# Patient Record
Sex: MALE | Race: WHITE | NOT HISPANIC OR LATINO | Employment: FULL TIME | URBAN - METROPOLITAN AREA
[De-identification: names, ages, dates, MRNs, and addresses within clinical notes are randomized per-mention and may not be internally consistent; named-entity substitution may affect disease eponyms.]

---

## 2019-01-31 ENCOUNTER — OFFICE VISIT CONVERTED (OUTPATIENT)
Dept: FAMILY MEDICINE CLINIC | Facility: CLINIC | Age: 24
End: 2019-01-31
Attending: FAMILY MEDICINE

## 2020-01-31 ENCOUNTER — OFFICE VISIT CONVERTED (OUTPATIENT)
Dept: FAMILY MEDICINE CLINIC | Facility: CLINIC | Age: 25
End: 2020-01-31
Attending: FAMILY MEDICINE

## 2021-05-15 VITALS
HEIGHT: 69 IN | OXYGEN SATURATION: 100 % | BODY MASS INDEX: 21.66 KG/M2 | WEIGHT: 146.25 LBS | SYSTOLIC BLOOD PRESSURE: 118 MMHG | DIASTOLIC BLOOD PRESSURE: 73 MMHG | HEART RATE: 84 BPM | TEMPERATURE: 97.9 F

## 2021-05-15 VITALS
OXYGEN SATURATION: 100 % | SYSTOLIC BLOOD PRESSURE: 119 MMHG | WEIGHT: 153.12 LBS | BODY MASS INDEX: 22.68 KG/M2 | HEART RATE: 61 BPM | HEIGHT: 69 IN | DIASTOLIC BLOOD PRESSURE: 84 MMHG

## 2022-07-20 ENCOUNTER — APPOINTMENT (OUTPATIENT)
Dept: ULTRASOUND IMAGING | Facility: HOSPITAL | Age: 27
End: 2022-07-20

## 2022-07-20 ENCOUNTER — HOSPITAL ENCOUNTER (EMERGENCY)
Facility: HOSPITAL | Age: 27
Discharge: HOME | End: 2022-07-20
Attending: EMERGENCY MEDICINE | Admitting: EMERGENCY MEDICINE

## 2022-07-20 VITALS
TEMPERATURE: 98.2 F | OXYGEN SATURATION: 100 % | HEART RATE: 70 BPM | WEIGHT: 147.27 LBS | SYSTOLIC BLOOD PRESSURE: 128 MMHG | RESPIRATION RATE: 16 BRPM | HEIGHT: 68 IN | BODY MASS INDEX: 22.32 KG/M2 | DIASTOLIC BLOOD PRESSURE: 78 MMHG

## 2022-07-20 DIAGNOSIS — N50.82 SCROTAL PAIN: Primary | ICD-10-CM

## 2022-07-20 PROCEDURE — 93976 VASCULAR STUDY: CPT

## 2022-07-20 PROCEDURE — 99282 EMERGENCY DEPT VISIT SF MDM: CPT

## 2022-07-20 RX ORDER — CEPHALEXIN 500 MG/1
500 CAPSULE ORAL 2 TIMES DAILY
Qty: 14 CAPSULE | Refills: 0 | Status: SHIPPED | OUTPATIENT
Start: 2022-07-20 | End: 2022-07-27

## 2022-07-20 NOTE — DISCHARGE INSTRUCTIONS
Follow-up your family doctor in 2 days as already scheduled.  Return to the emergency department for fever, burning/discomfort with urination or abdominal pain.

## 2022-07-20 NOTE — ED PROVIDER NOTES
Time: 11:33 AM EDT  Arrived by: private car  Chief Complaint: testicular pain  History provided by: pt  History is limited by: N/A     History of Present Illness:  Patient is a 26 y.o. year old male who presents to the emergency department with  A chief complaint of right sided testicle pain. Pt states he has been in pain for three days, and it has progressively worsened, and pt states the pain as achy, 6/10. Pt states he had abdomen pain about three days ago. Pt states he is a virgin, and has never had intercourse before. Pt denies trauma.          Similar Symptoms Previously: No  Recently seen: No      Patient Care Team  Primary Care Provider: Micha Sotelo DO    Past Medical History:     No Known Allergies  History reviewed. No pertinent past medical history.  History reviewed. No pertinent surgical history.  History reviewed. No pertinent family history.    Home Medications:  Prior to Admission medications    Not on File        Social History:   Social History     Tobacco Use   • Smoking status: Never Smoker   • Smokeless tobacco: Never Used   Vaping Use   • Vaping Use: Never used   Substance Use Topics   • Alcohol use: Yes     Comment: occ, socially   • Drug use: Never     Recent travel: no     Review of Systems:  Review of Systems   Constitutional: Negative for chills and fever.   HENT: Negative for congestion, rhinorrhea and sore throat.    Eyes: Negative for pain and visual disturbance.   Respiratory: Negative for apnea, cough, chest tightness and shortness of breath.    Cardiovascular: Negative for chest pain and palpitations.   Gastrointestinal: Positive for abdominal pain. Negative for diarrhea, nausea and vomiting.   Genitourinary: Positive for testicular pain (Right sided). Negative for difficulty urinating and dysuria.   Musculoskeletal: Negative for joint swelling and myalgias.   Skin: Negative for color change.   Neurological: Negative for seizures and headaches.   Psychiatric/Behavioral:  "Negative.    All other systems reviewed and are negative.       Physical Exam:  /78 (BP Location: Left arm, Patient Position: Sitting)   Pulse 70   Temp 98.2 °F (36.8 °C) (Oral)   Resp 16   Ht 172.7 cm (68\")   Wt 66.8 kg (147 lb 4.3 oz)   SpO2 100%   BMI 22.39 kg/m²     Physical Exam  Vitals and nursing note reviewed.   Constitutional:       Appearance: Normal appearance.   HENT:      Head: Normocephalic and atraumatic.      Nose: Nose normal.   Eyes:      Extraocular Movements: Extraocular movements intact.      Pupils: Pupils are equal, round, and reactive to light.   Cardiovascular:      Rate and Rhythm: Normal rate and regular rhythm.      Heart sounds: Normal heart sounds.   Pulmonary:      Effort: Pulmonary effort is normal.      Breath sounds: Normal breath sounds.   Abdominal:      General: Bowel sounds are normal.      Palpations: Abdomen is soft.      Tenderness: There is no abdominal tenderness.   Musculoskeletal:         General: No swelling. Normal range of motion.      Cervical back: Normal range of motion and neck supple.   Skin:     General: Skin is warm and dry.      Coloration: Skin is not jaundiced.   Neurological:      General: No focal deficit present.      Mental Status: He is alert and oriented to person, place, and time. Mental status is at baseline.   Psychiatric:         Mood and Affect: Mood normal.         Behavior: Behavior normal.         Judgment: Judgment normal.                Medications in the Emergency Department:  Medications - No data to display     Labs  Lab Results (last 24 hours)     ** No results found for the last 24 hours. **           Imaging:  No Radiology Exams Resulted Within Past 24 Hours    Procedures:  Procedures    Progress                            Medical Decision Making:  MDM  Number of Diagnoses or Management Options  Scrotal pain: new and requires workup     Amount and/or Complexity of Data Reviewed  Clinical lab tests: reviewed  Tests in the " radiology section of CPT®: reviewed  Tests in the medicine section of CPT®: reviewed  Review and summarize past medical records: yes  Independent visualization of images, tracings, or specimens: yes    Risk of Complications, Morbidity, and/or Mortality  Presenting problems: moderate  Management options: low    Patient Progress  Patient progress: stable       Final diagnoses:   Scrotal pain        Disposition:  ED Disposition     ED Disposition   Discharge    Condition   Stable    Comment   --             This medical record created using voice recognition software.           Cj Olmstead  07/20/22 [id number]       Jalen Gale MD  07/23/22 9648

## 2022-07-22 ENCOUNTER — OFFICE VISIT (OUTPATIENT)
Dept: FAMILY MEDICINE CLINIC | Facility: CLINIC | Age: 27
End: 2022-07-22

## 2022-07-22 VITALS
SYSTOLIC BLOOD PRESSURE: 125 MMHG | TEMPERATURE: 97.6 F | WEIGHT: 146 LBS | BODY MASS INDEX: 22.13 KG/M2 | OXYGEN SATURATION: 99 % | HEIGHT: 68 IN | DIASTOLIC BLOOD PRESSURE: 85 MMHG | HEART RATE: 61 BPM

## 2022-07-22 DIAGNOSIS — K40.90 NON-RECURRENT UNILATERAL INGUINAL HERNIA WITHOUT OBSTRUCTION OR GANGRENE: ICD-10-CM

## 2022-07-22 DIAGNOSIS — N50.811 PAIN IN RIGHT TESTICLE: Primary | ICD-10-CM

## 2022-07-22 PROCEDURE — 99213 OFFICE O/P EST LOW 20 MIN: CPT | Performed by: FAMILY MEDICINE

## 2022-07-22 NOTE — PROGRESS NOTES
Chief Complaint   Patient presents with   • Testicle Pain     Pt states been going on since Sunday.         Subjective     Serge Peterson  has no past medical history on file.    Testicle pain- complains of right testicle pain now for about the last 5 days.  He denies any preceding injury or trauma.  He denies any abnormalities about the testicle or swelling.  He was seen at the emergency room recently.  His ultrasound showed a small left-sided inguinal hernia, but nothing on the right side.      PHQ-2 Depression Screening  Little interest or pleasure in doing things?     Feeling down, depressed, or hopeless?     PHQ-2 Total Score     PHQ-9 Depression Screening  Little interest or pleasure in doing things?     Feeling down, depressed, or hopeless?     Trouble falling or staying asleep, or sleeping too much?     Feeling tired or having little energy?     Poor appetite or overeating?     Feeling bad about yourself - or that you are a failure or have let yourself or your family down?     Trouble concentrating on things, such as reading the newspaper or watching television?     Moving or speaking so slowly that other people could have noticed? Or the opposite - being so fidgety or restless that you have been moving around a lot more than usual?     Thoughts that you would be better off dead, or of hurting yourself in some way?     PHQ-9 Total Score     If you checked off any problems, how difficult have these problems made it for you to do your work, take care of things at home, or get along with other people?       No Known Allergies    Prior to Admission medications    Medication Sig Start Date End Date Taking? Authorizing Provider   cephalexin (KEFLEX) 500 MG capsule Take 1 capsule by mouth 2 (Two) Times a Day for 7 days. 7/20/22 7/27/22  Jalen Gale MD        Patient Active Problem List   Diagnosis   • Pain in right testicle   • Non-recurrent unilateral inguinal hernia without obstruction or gangrene     "    History reviewed. No pertinent surgical history.    Social History     Socioeconomic History   • Marital status: Single   Tobacco Use   • Smoking status: Never Smoker   • Smokeless tobacco: Never Used   Vaping Use   • Vaping Use: Never used   Substance and Sexual Activity   • Alcohol use: Yes     Comment: occ, socially   • Drug use: Never   • Sexual activity: Defer       History reviewed. No pertinent family history.    Family history, surgical history, past medical history, Allergies and meds reviewed with patient today and updated in [facility] EMR.     ROS:  Review of Systems   Constitutional: Negative for fatigue.   Genitourinary: Positive for testicular pain. Negative for dysuria, frequency and hematuria.       OBJECTIVE:  Vitals:    07/22/22 1028   BP: 125/85   Pulse: 61   Temp: 97.6 °F (36.4 °C)   SpO2: 99%   Weight: 66.2 kg (146 lb)   Height: 172.7 cm (68\")     No exam data present   Body mass index is 22.2 kg/m².  No LMP for male patient.    Physical Exam  Vitals and nursing note reviewed.   Constitutional:       General: He is not in acute distress.     Appearance: He is normal weight.   HENT:      Head: Normocephalic.   Genitourinary:     Penis: Normal and circumcised.       Testes: Normal.         Right: Tenderness not present.         Left: Tenderness not present.      Casey stage (genital): 5.   Neurological:      Mental Status: He is alert.         Procedures    No visits with results within 30 Day(s) from this visit.   Latest known visit with results is:   No results found for any previous visit.       ASSESSMENT/ PLAN:    Diagnoses and all orders for this visit:    1. Pain in right testicle (Primary)  Assessment & Plan:  His exam is normal today he had his ultrasound did not show any pathology on the right side.  He does have a small left inguinal hernia that he can get repaired at any time.  This is most likely an epididymitis.  We will have him get and take the course of antibiotics from the " emergency room.  If his symptoms persist he needs to be reevaluated.      2. Non-recurrent unilateral inguinal hernia without obstruction or gangrene  Assessment & Plan:  He is asymptomatic at this time.  Instructed him that this needs to be prepared in a timely fashion.  If he notices that it starts to get larger it is time to be of evaluated.        Orders Placed Today:     No orders of the defined types were placed in this encounter.       Management Plan:     An After Visit Summary was printed and given to the patient at discharge.    Follow-up: Return if symptoms worsen or fail to improve, for Recheck.    Micha Sotelo DO 7/22/2022 11:10 EDT  This note was electronically signed.

## 2022-07-22 NOTE — ASSESSMENT & PLAN NOTE
He is asymptomatic at this time.  Instructed him that this needs to be prepared in a timely fashion.  If he notices that it starts to get larger it is time to be of evaluated.

## 2022-07-22 NOTE — ASSESSMENT & PLAN NOTE
His exam is normal today he had his ultrasound did not show any pathology on the right side.  He does have a small left inguinal hernia that he can get repaired at any time.  This is most likely an epididymitis.  We will have him get and take the course of antibiotics from the emergency room.  If his symptoms persist he needs to be reevaluated.

## 2022-08-16 ENCOUNTER — TELEPHONE (OUTPATIENT)
Dept: FAMILY MEDICINE CLINIC | Facility: CLINIC | Age: 27
End: 2022-08-16

## 2022-08-16 ENCOUNTER — OFFICE VISIT (OUTPATIENT)
Dept: FAMILY MEDICINE CLINIC | Facility: CLINIC | Age: 27
End: 2022-08-16

## 2022-08-16 VITALS
SYSTOLIC BLOOD PRESSURE: 131 MMHG | DIASTOLIC BLOOD PRESSURE: 70 MMHG | HEART RATE: 63 BPM | OXYGEN SATURATION: 100 % | WEIGHT: 147.4 LBS | TEMPERATURE: 98.4 F | BODY MASS INDEX: 22.34 KG/M2 | HEIGHT: 68 IN

## 2022-08-16 DIAGNOSIS — H61.23 BILATERAL IMPACTED CERUMEN: Primary | ICD-10-CM

## 2022-08-16 PROCEDURE — 99213 OFFICE O/P EST LOW 20 MIN: CPT | Performed by: FAMILY MEDICINE

## 2022-08-16 NOTE — TELEPHONE ENCOUNTER
Pt is stating that he has ear pain and pressure , wants to know what he can do . Your at cap today . Please advise

## 2022-08-16 NOTE — ASSESSMENT & PLAN NOTE
We will irrigate both ears until clear and then reevaluate.  His left ear irrigated easily.  His right ear had a significant amount of impacted cerumen.  After several attempts at irrigation were still unable to get it cleared out adequately.  We will add on some Debrox drops and then he will irrigate with warm water at home.  If it still is persistent he will stop back for a nurse visit to get reirrigated again at the end of the week.

## 2022-08-16 NOTE — PROGRESS NOTES
Chief Complaint   Patient presents with   • Earache        Subjective     Serge Peterson  has no past medical history on file.    Earache- he has had a right earache for the past 24 hours.  He got an ear cleaning kit to clean it out and states it actually made his symptoms worse.  He denies any head congestion postnasal drip or runny nose.      PHQ-2 Depression Screening  Little interest or pleasure in doing things?     Feeling down, depressed, or hopeless?     PHQ-2 Total Score     PHQ-9 Depression Screening  Little interest or pleasure in doing things?     Feeling down, depressed, or hopeless?     Trouble falling or staying asleep, or sleeping too much?     Feeling tired or having little energy?     Poor appetite or overeating?     Feeling bad about yourself - or that you are a failure or have let yourself or your family down?     Trouble concentrating on things, such as reading the newspaper or watching television?     Moving or speaking so slowly that other people could have noticed? Or the opposite - being so fidgety or restless that you have been moving around a lot more than usual?     Thoughts that you would be better off dead, or of hurting yourself in some way?     PHQ-9 Total Score     If you checked off any problems, how difficult have these problems made it for you to do your work, take care of things at home, or get along with other people?       No Known Allergies    Prior to Admission medications    Not on File        Patient Active Problem List   Diagnosis   • Pain in right testicle   • Non-recurrent unilateral inguinal hernia without obstruction or gangrene   • Bilateral impacted cerumen        History reviewed. No pertinent surgical history.    Social History     Socioeconomic History   • Marital status: Single   Tobacco Use   • Smoking status: Never Smoker   • Smokeless tobacco: Never Used   Vaping Use   • Vaping Use: Never used   Substance and Sexual Activity   • Alcohol use: Yes     Comment:  "occ, socially   • Drug use: Never   • Sexual activity: Defer       History reviewed. No pertinent family history.    Family history, surgical history, past medical history, Allergies and meds reviewed with patient today and updated in [facility] EMR.     ROS:  Review of Systems   Constitutional: Negative for chills and fever.   HENT: Positive for ear pain (right). Negative for congestion, postnasal drip and rhinorrhea.        OBJECTIVE:  Vitals:    08/16/22 1515   BP: 131/70   BP Location: Right arm   Patient Position: Sitting   Pulse: 63   Temp: 98.4 °F (36.9 °C)   SpO2: 100%   Weight: 66.9 kg (147 lb 6.4 oz)   Height: 172.7 cm (68\")     No exam data present   Body mass index is 22.41 kg/m².  No LMP for male patient.    Physical Exam  Vitals and nursing note reviewed.   Constitutional:       General: He is not in acute distress.     Appearance: Normal appearance. He is normal weight.   HENT:      Head: Normocephalic.      Right Ear: Ear canal and external ear normal. There is impacted cerumen.      Left Ear: Ear canal and external ear normal. There is impacted cerumen.      Nose: Nose normal.      Mouth/Throat:      Mouth: Mucous membranes are moist.      Pharynx: Oropharynx is clear.   Eyes:      General: No scleral icterus.     Conjunctiva/sclera: Conjunctivae normal.      Pupils: Pupils are equal, round, and reactive to light.   Cardiovascular:      Rate and Rhythm: Normal rate and regular rhythm.      Pulses: Normal pulses.      Heart sounds: Normal heart sounds. No murmur heard.  Pulmonary:      Effort: Pulmonary effort is normal.      Breath sounds: Normal breath sounds. No wheezing, rhonchi or rales.   Musculoskeletal:      Cervical back: Neck supple. No rigidity or tenderness.   Lymphadenopathy:      Cervical: No cervical adenopathy.   Skin:     General: Skin is warm and dry.      Coloration: Skin is not jaundiced.      Findings: No rash.   Neurological:      General: No focal deficit present.      Mental Status: " He is alert and oriented to person, place, and time.   Psychiatric:         Mood and Affect: Mood normal.         Thought Content: Thought content normal.         Judgment: Judgment normal.         Procedures    No visits with results within 30 Day(s) from this visit.   Latest known visit with results is:   No results found for any previous visit.       ASSESSMENT/ PLAN:    Diagnoses and all orders for this visit:    1. Bilateral impacted cerumen (Primary)  Assessment & Plan:  We will irrigate both ears until clear and then reevaluate.  His left ear irrigated easily.  His right ear had a significant amount of impacted cerumen.  After several attempts at irrigation were still unable to get it cleared out adequately.  We will add on some Debrox drops and then he will irrigate with warm water at home.  If it still is persistent he will stop back for a nurse visit to get reirrigated again at the end of the week.    Orders:  -     Ear Cerumen Removal      Orders Placed Today:     No orders of the defined types were placed in this encounter.       Management Plan:     An After Visit Summary was printed and given to the patient at discharge.    Follow-up: Return if symptoms worsen or fail to improve.    Micha Sotelo DO 8/16/2022 17:02 EDT  This note was electronically signed.

## 2022-09-30 ENCOUNTER — OFFICE VISIT (OUTPATIENT)
Dept: FAMILY MEDICINE CLINIC | Facility: CLINIC | Age: 27
End: 2022-09-30

## 2022-09-30 VITALS
TEMPERATURE: 98 F | OXYGEN SATURATION: 99 % | DIASTOLIC BLOOD PRESSURE: 80 MMHG | HEART RATE: 74 BPM | BODY MASS INDEX: 22.43 KG/M2 | HEIGHT: 68 IN | WEIGHT: 148 LBS | SYSTOLIC BLOOD PRESSURE: 141 MMHG

## 2022-09-30 DIAGNOSIS — S39.012A LUMBAR STRAIN, INITIAL ENCOUNTER: Primary | ICD-10-CM

## 2022-09-30 PROCEDURE — 99213 OFFICE O/P EST LOW 20 MIN: CPT | Performed by: FAMILY MEDICINE

## 2022-09-30 RX ORDER — NAPROXEN 500 MG/1
500 TABLET ORAL 2 TIMES DAILY WITH MEALS
Qty: 60 TABLET | Refills: 0 | Status: SHIPPED | OUTPATIENT
Start: 2022-09-30 | End: 2022-12-12

## 2022-09-30 RX ORDER — CYCLOBENZAPRINE HCL 5 MG
5 TABLET ORAL 3 TIMES DAILY PRN
Qty: 20 TABLET | Refills: 0 | Status: SHIPPED | OUTPATIENT
Start: 2022-09-30 | End: 2022-12-12

## 2022-09-30 NOTE — PROGRESS NOTES
Chief Complaint   Patient presents with   • Back Pain     Left side         Subjective     Serge Peterson  has no past medical history on file.    Back Pain  This is a recurrent problem. The current episode started in the past 7 days. The problem occurs daily. The problem has been waxing and waning since onset. The pain is present in the lumbar spine. The quality of the pain is described as aching and stabbing. The pain does not radiate. The pain is at a severity of 7/10. The pain is worse during the day. The symptoms are aggravated by bending and twisting. Pertinent negatives include no abdominal pain, bladder incontinence, bowel incontinence, chest pain, dysuria, fever, headaches, leg pain, numbness, paresis, paresthesias, pelvic pain, perianal numbness, tingling, weakness or weight loss. The treatment provided no relief.       PHQ-2 Depression Screening  Little interest or pleasure in doing things?     Feeling down, depressed, or hopeless?     PHQ-2 Total Score     PHQ-9 Depression Screening  Little interest or pleasure in doing things?     Feeling down, depressed, or hopeless?     Trouble falling or staying asleep, or sleeping too much?     Feeling tired or having little energy?     Poor appetite or overeating?     Feeling bad about yourself - or that you are a failure or have let yourself or your family down?     Trouble concentrating on things, such as reading the newspaper or watching television?     Moving or speaking so slowly that other people could have noticed? Or the opposite - being so fidgety or restless that you have been moving around a lot more than usual?     Thoughts that you would be better off dead, or of hurting yourself in some way?     PHQ-9 Total Score     If you checked off any problems, how difficult have these problems made it for you to do your work, take care of things at home, or get along with other people?       No Known Allergies    Prior to Admission medications    Medication Sig  "Start Date End Date Taking? Authorizing Provider   carbamide peroxide (Debrox) 6.5 % otic solution Administer 5 drops to the right ear every night at bedtime. 8/16/22   Micha Sotelo,         Patient Active Problem List   Diagnosis   • Pain in right testicle   • Non-recurrent unilateral inguinal hernia without obstruction or gangrene   • Bilateral impacted cerumen   • Lumbar strain, initial encounter        History reviewed. No pertinent surgical history.    Social History     Socioeconomic History   • Marital status: Single   Tobacco Use   • Smoking status: Never Smoker   • Smokeless tobacco: Never Used   Vaping Use   • Vaping Use: Never used   Substance and Sexual Activity   • Alcohol use: Yes     Comment: occ, socially   • Drug use: Never   • Sexual activity: Defer       History reviewed. No pertinent family history.    Family history, surgical history, past medical history, Allergies and meds reviewed with patient today and updated in [facility] EMR.     ROS:  Review of Systems   Constitutional: Negative for fever and unexpected weight loss.   Cardiovascular: Negative for chest pain.   Gastrointestinal: Negative for abdominal pain and bowel incontinence.   Genitourinary: Negative for dysuria, pelvic pain and urinary incontinence.   Musculoskeletal: Positive for back pain.   Neurological: Negative for tingling, weakness, numbness and paresthesias.       OBJECTIVE:  Vitals:    09/30/22 0932   BP: 141/80   BP Location: Right arm   Patient Position: Sitting   Pulse: 74   Temp: 98 °F (36.7 °C)   SpO2: 99%   Weight: 67.1 kg (148 lb)   Height: 172.7 cm (68\")     No exam data present   Body mass index is 22.5 kg/m².  No LMP for male patient.    Physical Exam  Vitals and nursing note reviewed.   Constitutional:       General: He is not in acute distress.     Appearance: Normal appearance. He is normal weight.   HENT:      Head: Normocephalic.   Musculoskeletal:      Lumbar back: Tenderness present. Negative right " straight leg raise test and negative left straight leg raise test.        Back:    Neurological:      Mental Status: He is alert.      Sensory: Sensation is intact.      Motor: Motor function is intact.      Deep Tendon Reflexes:      Reflex Scores:       Patellar reflexes are 2+ on the right side and 2+ on the left side.       Achilles reflexes are 2+ on the right side and 2+ on the left side.        Procedures    No visits with results within 30 Day(s) from this visit.   Latest known visit with results is:   No results found for any previous visit.       ASSESSMENT/ PLAN:    Diagnoses and all orders for this visit:    1. Lumbar strain, initial encounter (Primary)  Assessment & Plan:  He has some intermittent recurrent lumbar strains.  We will treat him acutely with just some anti-inflammatories and muscle relaxers.  We will have him work on some core strengthening exercises.      Other orders  -     naproxen (Naprosyn) 500 MG tablet; Take 1 tablet by mouth 2 (Two) Times a Day With Meals.  Dispense: 60 tablet; Refill: 0  -     cyclobenzaprine (FLEXERIL) 5 MG tablet; Take 1 tablet by mouth 3 (Three) Times a Day As Needed for Muscle Spasms.  Dispense: 20 tablet; Refill: 0      Orders Placed Today:     New Medications Ordered This Visit   Medications   • naproxen (Naprosyn) 500 MG tablet     Sig: Take 1 tablet by mouth 2 (Two) Times a Day With Meals.     Dispense:  60 tablet     Refill:  0   • cyclobenzaprine (FLEXERIL) 5 MG tablet     Sig: Take 1 tablet by mouth 3 (Three) Times a Day As Needed for Muscle Spasms.     Dispense:  20 tablet     Refill:  0        Management Plan:     An After Visit Summary was printed and given to the patient at discharge.    Follow-up: Return if symptoms worsen or fail to improve.    Micha Sotelo,  9/30/2022 09:46 EDT  This note was electronically signed.  Answers for HPI/ROS submitted by the patient on 9/30/2022  What is the primary reason for your visit?: Back Pain

## 2022-09-30 NOTE — ASSESSMENT & PLAN NOTE
He has some intermittent recurrent lumbar strains.  We will treat him acutely with just some anti-inflammatories and muscle relaxers.  We will have him work on some core strengthening exercises.

## 2022-12-04 ENCOUNTER — APPOINTMENT (OUTPATIENT)
Dept: ULTRASOUND IMAGING | Facility: HOSPITAL | Age: 27
End: 2022-12-04

## 2022-12-04 ENCOUNTER — HOSPITAL ENCOUNTER (EMERGENCY)
Facility: HOSPITAL | Age: 27
Discharge: HOME | End: 2022-12-04
Attending: EMERGENCY MEDICINE | Admitting: EMERGENCY MEDICINE

## 2022-12-04 VITALS
HEIGHT: 68 IN | DIASTOLIC BLOOD PRESSURE: 87 MMHG | RESPIRATION RATE: 16 BRPM | BODY MASS INDEX: 21.89 KG/M2 | HEART RATE: 82 BPM | SYSTOLIC BLOOD PRESSURE: 137 MMHG | WEIGHT: 144.4 LBS | OXYGEN SATURATION: 100 % | TEMPERATURE: 98.6 F

## 2022-12-04 DIAGNOSIS — R30.0 DYSURIA: Primary | ICD-10-CM

## 2022-12-04 LAB
BILIRUB UR QL STRIP: ABNORMAL
C TRACH RRNA CVX QL NAA+PROBE: NOT DETECTED
CLARITY UR: CLEAR
COLOR UR: YELLOW
GLUCOSE UR STRIP-MCNC: NEGATIVE MG/DL
HGB UR QL STRIP.AUTO: NEGATIVE
KETONES UR QL STRIP: ABNORMAL
LEUKOCYTE ESTERASE UR QL STRIP.AUTO: NEGATIVE
N GONORRHOEA RRNA SPEC QL NAA+PROBE: NOT DETECTED
NITRITE UR QL STRIP: NEGATIVE
PH UR STRIP.AUTO: 6 [PH] (ref 5–8)
PROT UR QL STRIP: NEGATIVE
SP GR UR STRIP: 1.02 (ref 1–1.03)
UROBILINOGEN UR QL STRIP: ABNORMAL

## 2022-12-04 PROCEDURE — 87591 N.GONORRHOEAE DNA AMP PROB: CPT | Performed by: EMERGENCY MEDICINE

## 2022-12-04 PROCEDURE — 99283 EMERGENCY DEPT VISIT LOW MDM: CPT

## 2022-12-04 PROCEDURE — 76870 US EXAM SCROTUM: CPT

## 2022-12-04 PROCEDURE — 81003 URINALYSIS AUTO W/O SCOPE: CPT | Performed by: EMERGENCY MEDICINE

## 2022-12-04 PROCEDURE — 87491 CHLMYD TRACH DNA AMP PROBE: CPT | Performed by: EMERGENCY MEDICINE

## 2022-12-04 NOTE — ED PROVIDER NOTES
Time: 7:59 AM EST    Chief Complaint: Dysuria and right scrotal pain  History provided by: Patient  History is limited by: N/A     History of Present Illness:  Patient is a 27 y.o. year old male who presents to the emergency department with complaints of dysuria and right scrotal pain onset today.  Patient states he is not sexually active and does not believe he has an STD.  He states the pain started after ejaculation.  Denies bloody ejaculation or any other purulent discharge from the penis.  He also denies trauma to the area.    HPI    Similar Symptoms Previously: No  Recently seen: No      Patient Care Team  Primary Care Provider: Micha Sotelo DO    Past Medical History:     No Known Allergies  History reviewed. No pertinent past medical history.  History reviewed. No pertinent surgical history.  History reviewed. No pertinent family history.    Home Medications:  Prior to Admission medications    Medication Sig Start Date End Date Taking? Authorizing Provider   cyclobenzaprine (FLEXERIL) 5 MG tablet Take 1 tablet by mouth 3 (Three) Times a Day As Needed for Muscle Spasms. 9/30/22   Micha Sotelo DO   naproxen (Naprosyn) 500 MG tablet Take 1 tablet by mouth 2 (Two) Times a Day With Meals. 9/30/22   Micha Sotelo DO        Social History:   Social History     Tobacco Use   • Smoking status: Never   • Smokeless tobacco: Never   Vaping Use   • Vaping Use: Never used   Substance Use Topics   • Alcohol use: Yes     Comment: occ, socially   • Drug use: Never         Review of Systems:  Review of Systems   Constitutional: Negative for chills and fever.   HENT: Negative for congestion, rhinorrhea and sore throat.    Eyes: Negative for pain and visual disturbance.   Respiratory: Negative for apnea, cough, chest tightness and shortness of breath.    Cardiovascular: Negative for chest pain and palpitations.   Gastrointestinal: Negative for abdominal pain, diarrhea, nausea and vomiting.  "  Genitourinary: Positive for dysuria and testicular pain. Negative for difficulty urinating, genital sores, hematuria, penile discharge and scrotal swelling.   Musculoskeletal: Negative for joint swelling and myalgias.   Skin: Negative for color change.   Neurological: Negative for seizures and headaches.   Psychiatric/Behavioral: Negative.    All other systems reviewed and are negative.       Physical Exam:  /87 (BP Location: Right arm, Patient Position: Lying)   Pulse 82   Temp 98.6 °F (37 °C) (Oral)   Resp 16   Ht 172.7 cm (68\")   Wt 65.5 kg (144 lb 6.4 oz)   SpO2 100%   BMI 21.96 kg/m²     Physical Exam  Vitals and nursing note reviewed.   Constitutional:       General: He is not in acute distress.     Appearance: Normal appearance. He is not toxic-appearing.   HENT:      Head: Normocephalic and atraumatic.      Jaw: There is normal jaw occlusion.   Eyes:      General: Lids are normal.      Extraocular Movements: Extraocular movements intact.      Conjunctiva/sclera: Conjunctivae normal.      Pupils: Pupils are equal, round, and reactive to light.   Cardiovascular:      Rate and Rhythm: Normal rate and regular rhythm.      Pulses: Normal pulses.      Heart sounds: Normal heart sounds.   Pulmonary:      Effort: Pulmonary effort is normal. No respiratory distress.      Breath sounds: Normal breath sounds. No wheezing or rhonchi.   Abdominal:      General: Abdomen is flat.      Palpations: Abdomen is soft.      Tenderness: There is no abdominal tenderness. There is no guarding or rebound.   Genitourinary:     Penis: Normal.       Testes: Normal.   Musculoskeletal:         General: Normal range of motion.      Cervical back: Normal range of motion and neck supple.      Right lower leg: No edema.      Left lower leg: No edema.   Skin:     General: Skin is warm and dry.   Neurological:      Mental Status: He is alert and oriented to person, place, and time. Mental status is at baseline.   Psychiatric:    "      Mood and Affect: Mood normal.                Medications in the Emergency Department:  Medications - No data to display     Labs  Lab Results (last 24 hours)     Procedure Component Value Units Date/Time    Urinalysis With Microscopic If Indicated (No Culture) - Urine, Clean Catch [[id number]]  (Abnormal) Collected: 12/04/22 0721    Specimen: Urine, Clean Catch Updated: 12/04/22 0736     Color, UA Yellow     Appearance, UA Clear     pH, UA 6.0     Specific Gravity, UA 1.025     Glucose, UA Negative     Ketones, UA >=80 mg/dL (3+)     Bilirubin, UA Small (1+)     Blood, UA Negative     Protein, UA Negative     Leuk Esterase, UA Negative     Nitrite, UA Negative     Urobilinogen, UA 0.2 E.U./dL    Narrative:      Urine microscopic not indicated.    Chlamydia trachomatis, Neisseria gonorrhoeae, PCR - Urine, Urine, Random Void [[id number]] Collected: 12/04/22 0722    Specimen: Urine, Random Void Updated: 12/04/22 0728           Imaging:  US Scrotum & Testicles    Result Date: 12/4/2022  PROCEDURE: US SCROTUM AND TESTICLES  COMPARISON: 7/20/2022.  INDICATIONS: 27-YEAR-OLD MALE W/ H/O RIGHT-SIDED TESTICULAR/SCROTAL PAIN.  TECHNIQUE:   The scrotum and testicles (testes) were evaluated by routine duplex ultrasound examination, including two-dimensional grayscale images as well as color/spectral duplex Doppler analysis.   FINDINGS:   TESTES:  Normal.  No visible mass.  Normal echotexture, size, and flow are noted.  The right testis measures 4.4 x 1.8 x 2.6 cm.  The left testis measures 4.4 x 2.1 x 2.4 cm.  The right testicular volume is approximately 10.7 mL. The left testicular volume is approximately 11.3 mL.  EPIDIDYMIDES:  No acute findings are seen involving the epididymides.  No acute epididymitis.  OTHER:  There is a small left hydrocele, which may contain debris.  A similar finding was seen previously.  The previously seen fat-containing left-sided inguinal hernia is not clearly appreciated on the current study.   No varicocele is appreciated bilaterally.  No definite right-sided hydrocele.  No definite right-sided inguinal hernia.  No abnormalities of the scrotal wall are appreciated.         1. No testicular torsion is identified.  2. No intrinsic or extrinsic testicular masses are seen.  3. No acute epididymo-orchitis is suggested.  4. There is a small left hydrocele with debris.  No right-sided hydrocele.  5.  Please see above comments for further detail.    Please note that portions of this note were completed with a voice recognition program.  NOA PITT JR, MD       Electronically Signed and Approved By: NOA PITT JR, MD on 12/04/2022 at 6:06                Procedures:  Procedures    Progress                            Medical Decision Making:  MDM  Number of Diagnoses or Management Options  Dysuria  Diagnosis management comments: In summary this is a 27-year-old male who presents to the emergency department for evaluation of dysuria and right scrotal pain.  Testicular ultrasound is unremarkable for acute pathology.  Urinalysis is also unremarkable except for may be mild dehydration present.  Urinalysis does not reveal UTI.  Gonorrhea chlamydia testing is pending however patient states he is not sexually active therefore doubt STD currently.  Patient has been reassured and instructed to follow-up with PCP.  Very strict return to ER and follow-up instructions have been provided to the patient.         Final diagnoses:   Dysuria        Disposition:  ED Disposition     ED Disposition   Discharge    Condition   Stable    Comment   --             This medical record created using voice recognition software.           Jordan Pro MD  12/04/22 0801

## 2022-12-12 ENCOUNTER — OFFICE VISIT (OUTPATIENT)
Dept: FAMILY MEDICINE CLINIC | Facility: CLINIC | Age: 27
End: 2022-12-12

## 2022-12-12 VITALS
DIASTOLIC BLOOD PRESSURE: 89 MMHG | SYSTOLIC BLOOD PRESSURE: 132 MMHG | HEIGHT: 68 IN | HEART RATE: 69 BPM | OXYGEN SATURATION: 98 % | TEMPERATURE: 98 F | BODY MASS INDEX: 22.28 KG/M2 | WEIGHT: 147 LBS

## 2022-12-12 DIAGNOSIS — N50.811 PAIN IN RIGHT TESTICLE: Primary | ICD-10-CM

## 2022-12-12 PROCEDURE — 99213 OFFICE O/P EST LOW 20 MIN: CPT | Performed by: FAMILY MEDICINE

## 2022-12-12 NOTE — PROGRESS NOTES
Chief Complaint   Patient presents with   • Follow-up     ER  scrotum pain         Subjective     Serge Peterson  has no past medical history on file.    ER follow-up- he was to the emergency room recently with some scrotal pain.  He had initially some discharge area but no frequency urgency or gross hematuria.  The pain had persisted thus he presented to the emergency room.  He states the pain did seem to radiate from his scrotum into his peritoneum.  His evaluation in the emergency room including a scrotal ultrasound was negative.  Since then his discomfort has resolved.      PHQ-2 Depression Screening  Little interest or pleasure in doing things?     Feeling down, depressed, or hopeless?     PHQ-2 Total Score     PHQ-9 Depression Screening  Little interest or pleasure in doing things?     Feeling down, depressed, or hopeless?     Trouble falling or staying asleep, or sleeping too much?     Feeling tired or having little energy?     Poor appetite or overeating?     Feeling bad about yourself - or that you are a failure or have let yourself or your family down?     Trouble concentrating on things, such as reading the newspaper or watching television?     Moving or speaking so slowly that other people could have noticed? Or the opposite - being so fidgety or restless that you have been moving around a lot more than usual?     Thoughts that you would be better off dead, or of hurting yourself in some way?     PHQ-9 Total Score     If you checked off any problems, how difficult have these problems made it for you to do your work, take care of things at home, or get along with other people?       No Known Allergies    Prior to Admission medications    Medication Sig Start Date End Date Taking? Authorizing Provider   cyclobenzaprine (FLEXERIL) 5 MG tablet Take 1 tablet by mouth 3 (Three) Times a Day As Needed for Muscle Spasms. 9/30/22 12/12/22  Micha Sotelo, DO   naproxen (Naprosyn) 500 MG tablet Take 1  "tablet by mouth 2 (Two) Times a Day With Meals. 9/30/22 12/12/22  Micha Sotelo DO        Patient Active Problem List   Diagnosis   • Pain in right testicle   • Non-recurrent unilateral inguinal hernia without obstruction or gangrene   • Bilateral impacted cerumen   • Lumbar strain, initial encounter        History reviewed. No pertinent surgical history.    Social History     Socioeconomic History   • Marital status: Single   Tobacco Use   • Smoking status: Never   • Smokeless tobacco: Never   Vaping Use   • Vaping Use: Never used   Substance and Sexual Activity   • Alcohol use: Yes     Comment: occ, socially   • Drug use: Never   • Sexual activity: Defer       History reviewed. No pertinent family history.    Family history, surgical history, past medical history, Allergies and meds reviewed with patient today and updated in [facility] EMR.     ROS:  Review of Systems   Genitourinary: Negative for difficulty urinating, dysuria, frequency, hematuria, scrotal swelling and testicular pain.       OBJECTIVE:  Vitals:    12/12/22 1353   BP: 132/89   BP Location: Left arm   Patient Position: Sitting   Pulse: 69   Temp: 98 °F (36.7 °C)   SpO2: 98%   Weight: 66.7 kg (147 lb)   Height: 172.7 cm (68\")     No results found.   Body mass index is 22.35 kg/m².  No LMP for male patient.    Physical Exam  Vitals and nursing note reviewed.   Constitutional:       Appearance: Normal appearance. He is normal weight.   HENT:      Head: Normocephalic.   Genitourinary:     Penis: Normal. No tenderness, discharge or lesions.       Testes: Normal.         Right: Mass, tenderness or swelling not present.         Left: Mass, tenderness or swelling not present.      Casey stage (genital): 5.   Neurological:      Mental Status: He is alert.         Procedures    Admission on 12/04/2022, Discharged on 12/04/2022   Component Date Value Ref Range Status   • Color, UA 12/04/2022 Yellow  Yellow, Straw Final   • Appearance, UA 12/04/2022 " Clear  Clear Final   • pH, UA 12/04/2022 6.0  5.0 - 8.0 Final   • Specific Gravity, UA 12/04/2022 1.025  1.005 - 1.030 Final   • Glucose, UA 12/04/2022 Negative  Negative Final   • Ketones, UA 12/04/2022 >=80 mg/dL (3+) (A)  Negative Final   • Bilirubin, UA 12/04/2022 Small (1+) (A)  Negative Final   • Blood, UA 12/04/2022 Negative  Negative Final   • Protein, UA 12/04/2022 Negative  Negative Final   • Leuk Esterase, UA 12/04/2022 Negative  Negative Final   • Nitrite, UA 12/04/2022 Negative  Negative Final   • Urobilinogen, UA 12/04/2022 0.2 E.U./dL  0.2 - 1.0 E.U./dL Final   • Chlamydia DNA by PCR 12/04/2022 Not Detected  Not Detected  Final   • Neisseria gonorrhoeae by PCR 12/04/2022 Not Detected  Not Detected  Final       ASSESSMENT/ PLAN:    Diagnoses and all orders for this visit:    1. Pain in right testicle (Primary)  Assessment & Plan:  His discomfort has since resolved.  His evaluation including a scrotal ultrasound was negative.  This is most likely a benign nature.        Orders Placed Today:     No orders of the defined types were placed in this encounter.       Management Plan:     An After Visit Summary was printed and given to the patient at discharge.    Follow-up: Return if symptoms worsen or fail to improve.    Micha Sotelo,  12/12/2022 14:27 EST  This note was electronically signed.

## 2022-12-12 NOTE — ASSESSMENT & PLAN NOTE
His discomfort has since resolved.  His evaluation including a scrotal ultrasound was negative.  This is most likely a benign nature.

## 2022-12-13 ENCOUNTER — CLINICAL SUPPORT (OUTPATIENT)
Dept: FAMILY MEDICINE CLINIC | Facility: CLINIC | Age: 27
End: 2022-12-13
Payer: COMMERCIAL

## 2022-12-13 ENCOUNTER — TELEPHONE (OUTPATIENT)
Dept: FAMILY MEDICINE CLINIC | Facility: CLINIC | Age: 27
End: 2022-12-13

## 2022-12-13 DIAGNOSIS — N50.811 PAIN IN RIGHT TESTICLE: Primary | ICD-10-CM

## 2022-12-13 LAB
BILIRUB BLD-MCNC: NEGATIVE MG/DL
CLARITY, POC: CLEAR
COLOR UR: YELLOW
EXPIRATION DATE: NORMAL
GLUCOSE UR STRIP-MCNC: NEGATIVE MG/DL
KETONES UR QL: NEGATIVE
LEUKOCYTE EST, POC: NEGATIVE
Lab: NORMAL
NITRITE UR-MCNC: NEGATIVE MG/ML
PH UR: 6 [PH] (ref 5–8)
PROT UR STRIP-MCNC: NEGATIVE MG/DL
RBC # UR STRIP: NEGATIVE /UL
SP GR UR: 1.01 (ref 1–1.03)
UROBILINOGEN UR QL: NORMAL

## 2022-12-13 PROCEDURE — 81003 URINALYSIS AUTO W/O SCOPE: CPT | Performed by: FAMILY MEDICINE

## 2022-12-13 NOTE — TELEPHONE ENCOUNTER
Caller: Serge Peterson    Relationship: Self    Best call back number: [phone]    Who are you requesting to speak with (clinical staff, provider,  specific staff member): CLINICAL    What was the call regarding: PATIENT STATES HE WAS TOLD TO MONITOR HIS SYMPTOMS AFTER HIS 12/12/22 APPOINTMENT WITH DR ARORA. PATIENT STATES SHE HAS BEEN URINATING EVERY 30 MINUTES AND WOULD LIKE DR ARORA TO KNOW, AND HE WOULD LIKE A CALL BACK TO KNOW WHAT DR ARORA ADVISES IF ANYTHING.    Do you require a callback: YES

## 2022-12-13 NOTE — TELEPHONE ENCOUNTER
Hpi Title: Evaluation of Skin Lesions Spoke with patient, coming in today to leave urine.    How Severe Are Your Spot(S)?: moderate Have Your Spot(S) Been Treated In The Past?: has not been treated

## 2022-12-16 ENCOUNTER — TELEPHONE (OUTPATIENT)
Dept: FAMILY MEDICINE CLINIC | Facility: CLINIC | Age: 27
End: 2022-12-16

## 2022-12-16 DIAGNOSIS — N50.811 PAIN IN RIGHT TESTICLE: Primary | ICD-10-CM

## 2022-12-16 NOTE — TELEPHONE ENCOUNTER
Pt states he was seen on Monday and he is not feeling better . He states he is still having burning perineum. Please advise

## 2022-12-21 ENCOUNTER — TELEPHONE (OUTPATIENT)
Dept: FAMILY MEDICINE CLINIC | Facility: CLINIC | Age: 27
End: 2022-12-21

## 2022-12-21 NOTE — TELEPHONE ENCOUNTER
Caller: Serge Peterson    Relationship: Self    Best call back number: [phone]    What orders are you requesting (i.e. lab or imaging): LABS    In what timeframe would the patient need to come in: 12/22/2022    Where will you receive your lab/imaging services: IN OFFICE     Additional notes: PATIENT IS REQUESTING LAB ORDERS IN REGARDS TO THE PAIN IN BOTH SIDES THAT HE HAS BEEN EXPERIENCING.

## 2022-12-22 ENCOUNTER — CLINICAL SUPPORT (OUTPATIENT)
Dept: FAMILY MEDICINE CLINIC | Facility: CLINIC | Age: 27
End: 2022-12-22

## 2022-12-22 DIAGNOSIS — R10.9 FLANK PAIN: Primary | ICD-10-CM

## 2022-12-22 DIAGNOSIS — Z79.899 MEDICATION MANAGEMENT: ICD-10-CM

## 2022-12-22 PROCEDURE — 85025 COMPLETE CBC W/AUTO DIFF WBC: CPT | Performed by: FAMILY MEDICINE

## 2022-12-22 PROCEDURE — 36415 COLL VENOUS BLD VENIPUNCTURE: CPT | Performed by: FAMILY MEDICINE

## 2022-12-22 PROCEDURE — 80048 BASIC METABOLIC PNL TOTAL CA: CPT | Performed by: FAMILY MEDICINE

## 2022-12-23 LAB
ANION GAP SERPL CALCULATED.3IONS-SCNC: 6.7 MMOL/L (ref 5–15)
ANISOCYTOSIS BLD QL: ABNORMAL
BUN SERPL-MCNC: 14 MG/DL (ref 6–20)
BUN/CREAT SERPL: 15.4 (ref 7–25)
CALCIUM SPEC-SCNC: 9.4 MG/DL (ref 8.6–10.5)
CHLORIDE SERPL-SCNC: 100 MMOL/L (ref 98–107)
CO2 SERPL-SCNC: 29.3 MMOL/L (ref 22–29)
CREAT SERPL-MCNC: 0.91 MG/DL (ref 0.76–1.27)
DEPRECATED RDW RBC AUTO: 35.8 FL (ref 37–54)
EGFRCR SERPLBLD CKD-EPI 2021: 120 ML/MIN/1.73
EOSINOPHIL # BLD MANUAL: 0.08 10*3/MM3 (ref 0–0.4)
EOSINOPHIL NFR BLD MANUAL: 1 % (ref 0.3–6.2)
ERYTHROCYTE [DISTWIDTH] IN BLOOD BY AUTOMATED COUNT: 17.2 % (ref 12.3–15.4)
GLUCOSE SERPL-MCNC: 91 MG/DL (ref 65–99)
HCT VFR BLD AUTO: 42.1 % (ref 37.5–51)
HGB BLD-MCNC: 13 G/DL (ref 13–17.7)
LYMPHOCYTES # BLD MANUAL: 3.29 10*3/MM3 (ref 0.7–3.1)
LYMPHOCYTES NFR BLD MANUAL: 8 % (ref 5–12)
MCH RBC QN AUTO: 20.5 PG (ref 26.6–33)
MCHC RBC AUTO-ENTMCNC: 30.9 G/DL (ref 31.5–35.7)
MCV RBC AUTO: 66.5 FL (ref 79–97)
MICROCYTES BLD QL: ABNORMAL
MONOCYTES # BLD: 0.63 10*3/MM3 (ref 0.1–0.9)
NEUTROPHILS # BLD AUTO: 3.84 10*3/MM3 (ref 1.7–7)
NEUTROPHILS NFR BLD MANUAL: 49 % (ref 42.7–76)
PLAT MORPH BLD: NORMAL
PLATELET # BLD AUTO: 232 10*3/MM3 (ref 140–450)
PMV BLD AUTO: 11.6 FL (ref 6–12)
POTASSIUM SERPL-SCNC: 4.3 MMOL/L (ref 3.5–5.2)
RBC # BLD AUTO: 6.33 10*6/MM3 (ref 4.14–5.8)
SODIUM SERPL-SCNC: 136 MMOL/L (ref 136–145)
VARIANT LYMPHS NFR BLD MANUAL: 42 % (ref 19.6–45.3)
WBC MORPH BLD: NORMAL
WBC NRBC COR # BLD: 7.83 10*3/MM3 (ref 3.4–10.8)

## 2022-12-27 ENCOUNTER — LAB (OUTPATIENT)
Dept: LAB | Facility: HOSPITAL | Age: 27
End: 2022-12-27

## 2022-12-27 DIAGNOSIS — R71.8 MICROCYTOSIS: ICD-10-CM

## 2022-12-27 DIAGNOSIS — R71.8 MICROCYTOSIS: Primary | ICD-10-CM

## 2022-12-27 LAB
BASOPHILS # BLD MANUAL: 0.06 10*3/MM3 (ref 0–0.2)
BASOPHILS NFR BLD MANUAL: 1 % (ref 0–1.5)
DEPRECATED RDW RBC AUTO: 37.9 FL (ref 37–54)
EOSINOPHIL # BLD MANUAL: 0.18 10*3/MM3 (ref 0–0.4)
EOSINOPHIL NFR BLD MANUAL: 3 % (ref 0.3–6.2)
ERYTHROCYTE [DISTWIDTH] IN BLOOD BY AUTOMATED COUNT: 17.8 % (ref 12.3–15.4)
FERRITIN SERPL-MCNC: 194 NG/ML (ref 30–400)
HCT VFR BLD AUTO: 45.7 % (ref 37.5–51)
HGB BLD-MCNC: 13.7 G/DL (ref 13–17.7)
IRON 24H UR-MRATE: 151 MCG/DL (ref 59–158)
IRON SATN MFR SERPL: 37 % (ref 20–50)
LYMPHOCYTES # BLD MANUAL: 1.47 10*3/MM3 (ref 0.7–3.1)
LYMPHOCYTES NFR BLD MANUAL: 7.1 % (ref 5–12)
MCH RBC QN AUTO: 20 PG (ref 26.6–33)
MCHC RBC AUTO-ENTMCNC: 30 G/DL (ref 31.5–35.7)
MCV RBC AUTO: 66.6 FL (ref 79–97)
MONOCYTES # BLD: 0.43 10*3/MM3 (ref 0.1–0.9)
NEUTROPHILS # BLD AUTO: 3.93 10*3/MM3 (ref 1.7–7)
NEUTROPHILS NFR BLD MANUAL: 64.6 % (ref 42.7–76)
PATHOLOGY REVIEW: YES
PLAT MORPH BLD: NORMAL
PLATELET # BLD AUTO: 249 10*3/MM3 (ref 140–450)
RBC # BLD AUTO: 6.86 10*6/MM3 (ref 4.14–5.8)
RBC MORPH BLD: NORMAL
TIBC SERPL-MCNC: 411 MCG/DL (ref 298–536)
TRANSFERRIN SERPL-MCNC: 276 MG/DL (ref 200–360)
VARIANT LYMPHS NFR BLD MANUAL: 24.2 % (ref 19.6–45.3)
WBC MORPH BLD: NORMAL
WBC NRBC COR # BLD: 6.09 10*3/MM3 (ref 3.4–10.8)

## 2022-12-27 PROCEDURE — 36415 COLL VENOUS BLD VENIPUNCTURE: CPT

## 2022-12-27 PROCEDURE — 85007 BL SMEAR W/DIFF WBC COUNT: CPT

## 2022-12-27 PROCEDURE — 84466 ASSAY OF TRANSFERRIN: CPT | Performed by: FAMILY MEDICINE

## 2022-12-27 PROCEDURE — 82525 ASSAY OF COPPER: CPT

## 2022-12-27 PROCEDURE — 82728 ASSAY OF FERRITIN: CPT | Performed by: FAMILY MEDICINE

## 2022-12-27 PROCEDURE — 84630 ASSAY OF ZINC: CPT

## 2022-12-27 PROCEDURE — 83540 ASSAY OF IRON: CPT | Performed by: FAMILY MEDICINE

## 2022-12-27 PROCEDURE — 83655 ASSAY OF LEAD: CPT

## 2022-12-27 PROCEDURE — 85025 COMPLETE CBC W/AUTO DIFF WBC: CPT

## 2022-12-28 LAB
LAB AP CASE REPORT: NORMAL
LEAD BLDV-MCNC: <1 UG/DL (ref 0–3.4)
PATH REPORT.FINAL DX SPEC: NORMAL

## 2022-12-29 ENCOUNTER — LAB (OUTPATIENT)
Dept: LAB | Facility: HOSPITAL | Age: 27
End: 2022-12-29
Payer: COMMERCIAL

## 2022-12-29 DIAGNOSIS — R71.8 MICROCYTOSIS: ICD-10-CM

## 2022-12-29 DIAGNOSIS — R71.8 MICROCYTOSIS: Primary | ICD-10-CM

## 2022-12-29 PROCEDURE — 36415 COLL VENOUS BLD VENIPUNCTURE: CPT

## 2022-12-29 PROCEDURE — 83020 HEMOGLOBIN ELECTROPHORESIS: CPT

## 2022-12-30 LAB
HGB A MFR BLD ELPH: 97.6 % (ref 96.4–98.8)
HGB A2 MFR BLD ELPH: 2.4 % (ref 1.8–3.2)
HGB F MFR BLD ELPH: 0 % (ref 0–2)
HGB FRACT BLD-IMP: NORMAL
HGB S MFR BLD ELPH: 0 %
ZINC SERPL-MCNC: 91 UG/DL (ref 44–115)

## 2023-01-01 LAB — COPPER SERPL-MCNC: 82 UG/DL (ref 63–121)

## 2023-01-03 ENCOUNTER — HOSPITAL ENCOUNTER (EMERGENCY)
Facility: HOSPITAL | Age: 28
Discharge: HOME | End: 2023-01-03
Attending: EMERGENCY MEDICINE | Admitting: EMERGENCY MEDICINE
Payer: COMMERCIAL

## 2023-01-03 ENCOUNTER — APPOINTMENT (OUTPATIENT)
Dept: GENERAL RADIOLOGY | Facility: HOSPITAL | Age: 28
End: 2023-01-03
Payer: COMMERCIAL

## 2023-01-03 VITALS
HEART RATE: 60 BPM | BODY MASS INDEX: 21.89 KG/M2 | HEIGHT: 68 IN | DIASTOLIC BLOOD PRESSURE: 85 MMHG | OXYGEN SATURATION: 98 % | RESPIRATION RATE: 18 BRPM | SYSTOLIC BLOOD PRESSURE: 120 MMHG | WEIGHT: 144.4 LBS | TEMPERATURE: 98.2 F

## 2023-01-03 DIAGNOSIS — R07.89 CHEST WALL PAIN: Primary | ICD-10-CM

## 2023-01-03 DIAGNOSIS — M94.0 COSTOCHONDRITIS: ICD-10-CM

## 2023-01-03 LAB
ALBUMIN SERPL-MCNC: 4.4 G/DL (ref 3.5–5.2)
ALBUMIN/GLOB SERPL: 1.6 G/DL
ALP SERPL-CCNC: 119 U/L (ref 39–117)
ALT SERPL W P-5'-P-CCNC: 13 U/L (ref 1–41)
ANION GAP SERPL CALCULATED.3IONS-SCNC: 12.1 MMOL/L (ref 5–15)
ANISOCYTOSIS BLD QL: NORMAL
AST SERPL-CCNC: 13 U/L (ref 1–40)
BASOPHILS # BLD AUTO: 0.06 10*3/MM3 (ref 0–0.2)
BASOPHILS NFR BLD AUTO: 0.7 % (ref 0–1.5)
BILIRUB SERPL-MCNC: 0.2 MG/DL (ref 0–1.2)
BUN SERPL-MCNC: 15 MG/DL (ref 6–20)
BUN/CREAT SERPL: 14.9 (ref 7–25)
CALCIUM SPEC-SCNC: 9.2 MG/DL (ref 8.6–10.5)
CHLORIDE SERPL-SCNC: 102 MMOL/L (ref 98–107)
CO2 SERPL-SCNC: 24.9 MMOL/L (ref 22–29)
CREAT SERPL-MCNC: 1.01 MG/DL (ref 0.76–1.27)
DEPRECATED RDW RBC AUTO: 33.2 FL (ref 37–54)
EGFRCR SERPLBLD CKD-EPI 2021: 105.8 ML/MIN/1.73
EOSINOPHIL # BLD AUTO: 0.36 10*3/MM3 (ref 0–0.4)
EOSINOPHIL NFR BLD AUTO: 4.5 % (ref 0.3–6.2)
ERYTHROCYTE [DISTWIDTH] IN BLOOD BY AUTOMATED COUNT: 15.4 % (ref 12.3–15.4)
GLOBULIN UR ELPH-MCNC: 2.7 GM/DL
GLUCOSE SERPL-MCNC: 114 MG/DL (ref 65–99)
HCT VFR BLD AUTO: 42.1 % (ref 37.5–51)
HGB BLD-MCNC: 13.6 G/DL (ref 13–17.7)
HOLD SPECIMEN: NORMAL
HOLD SPECIMEN: NORMAL
IMM GRANULOCYTES # BLD AUTO: 0.04 10*3/MM3 (ref 0–0.05)
IMM GRANULOCYTES NFR BLD AUTO: 0.5 % (ref 0–0.5)
LIPASE SERPL-CCNC: 31 U/L (ref 13–60)
LYMPHOCYTES # BLD AUTO: 3.71 10*3/MM3 (ref 0.7–3.1)
LYMPHOCYTES NFR BLD AUTO: 46 % (ref 19.6–45.3)
MACROCYTES BLD QL SMEAR: NORMAL
MAGNESIUM SERPL-MCNC: 1.8 MG/DL (ref 1.6–2.6)
MCH RBC QN AUTO: 21 PG (ref 26.6–33)
MCHC RBC AUTO-ENTMCNC: 32.3 G/DL (ref 31.5–35.7)
MCV RBC AUTO: 64.9 FL (ref 79–97)
MICROCYTES BLD QL: NORMAL
MONOCYTES # BLD AUTO: 0.44 10*3/MM3 (ref 0.1–0.9)
MONOCYTES NFR BLD AUTO: 5.5 % (ref 5–12)
NEUTROPHILS NFR BLD AUTO: 3.46 10*3/MM3 (ref 1.7–7)
NEUTROPHILS NFR BLD AUTO: 42.8 % (ref 42.7–76)
NRBC BLD AUTO-RTO: 0 /100 WBC (ref 0–0.2)
NT-PROBNP SERPL-MCNC: <36 PG/ML (ref 0–450)
OVALOCYTES BLD QL SMEAR: NORMAL
PLATELET # BLD AUTO: 234 10*3/MM3 (ref 140–450)
PMV BLD AUTO: 10.9 FL (ref 6–12)
POTASSIUM SERPL-SCNC: 3.5 MMOL/L (ref 3.5–5.2)
PROT SERPL-MCNC: 7.1 G/DL (ref 6–8.5)
RBC # BLD AUTO: 6.49 10*6/MM3 (ref 4.14–5.8)
SMALL PLATELETS BLD QL SMEAR: ADEQUATE
SODIUM SERPL-SCNC: 139 MMOL/L (ref 136–145)
TROPONIN I SERPL-MCNC: 0 NG/ML (ref 0–0.08)
TROPONIN I SERPL-MCNC: 0 NG/ML (ref 0–0.08)
WBC MORPH BLD: NORMAL
WBC NRBC COR # BLD: 8.07 10*3/MM3 (ref 3.4–10.8)
WHOLE BLOOD HOLD COAG: NORMAL
WHOLE BLOOD HOLD SPECIMEN: NORMAL

## 2023-01-03 PROCEDURE — 93005 ELECTROCARDIOGRAM TRACING: CPT | Performed by: EMERGENCY MEDICINE

## 2023-01-03 PROCEDURE — 80053 COMPREHEN METABOLIC PANEL: CPT | Performed by: EMERGENCY MEDICINE

## 2023-01-03 PROCEDURE — 84484 ASSAY OF TROPONIN QUANT: CPT

## 2023-01-03 PROCEDURE — 83880 ASSAY OF NATRIURETIC PEPTIDE: CPT | Performed by: EMERGENCY MEDICINE

## 2023-01-03 PROCEDURE — 96372 THER/PROPH/DIAG INJ SC/IM: CPT

## 2023-01-03 PROCEDURE — 36415 COLL VENOUS BLD VENIPUNCTURE: CPT

## 2023-01-03 PROCEDURE — 25010000002 KETOROLAC TROMETHAMINE PER 15 MG: Performed by: EMERGENCY MEDICINE

## 2023-01-03 PROCEDURE — 83735 ASSAY OF MAGNESIUM: CPT | Performed by: EMERGENCY MEDICINE

## 2023-01-03 PROCEDURE — 99283 EMERGENCY DEPT VISIT LOW MDM: CPT

## 2023-01-03 PROCEDURE — 71045 X-RAY EXAM CHEST 1 VIEW: CPT

## 2023-01-03 PROCEDURE — 93010 ELECTROCARDIOGRAM REPORT: CPT | Performed by: INTERNAL MEDICINE

## 2023-01-03 PROCEDURE — 85007 BL SMEAR W/DIFF WBC COUNT: CPT | Performed by: EMERGENCY MEDICINE

## 2023-01-03 PROCEDURE — 93005 ELECTROCARDIOGRAM TRACING: CPT

## 2023-01-03 PROCEDURE — 85025 COMPLETE CBC W/AUTO DIFF WBC: CPT | Performed by: EMERGENCY MEDICINE

## 2023-01-03 PROCEDURE — 83690 ASSAY OF LIPASE: CPT | Performed by: EMERGENCY MEDICINE

## 2023-01-03 RX ORDER — SODIUM CHLORIDE 0.9 % (FLUSH) 0.9 %
10 SYRINGE (ML) INJECTION AS NEEDED
Status: DISCONTINUED | OUTPATIENT
Start: 2023-01-03 | End: 2023-01-03 | Stop reason: HOSPADM

## 2023-01-03 RX ORDER — KETOROLAC TROMETHAMINE 30 MG/ML
30 INJECTION, SOLUTION INTRAMUSCULAR; INTRAVENOUS ONCE
Status: COMPLETED | OUTPATIENT
Start: 2023-01-03 | End: 2023-01-03

## 2023-01-03 RX ORDER — ASPIRIN 81 MG/1
324 TABLET, CHEWABLE ORAL ONCE
Status: DISCONTINUED | OUTPATIENT
Start: 2023-01-03 | End: 2023-01-03 | Stop reason: HOSPADM

## 2023-01-03 RX ORDER — NAPROXEN 500 MG/1
500 TABLET ORAL 2 TIMES DAILY WITH MEALS
Qty: 14 TABLET | Refills: 0 | Status: SHIPPED | OUTPATIENT
Start: 2023-01-03 | End: 2023-01-17

## 2023-01-03 RX ADMIN — KETOROLAC TROMETHAMINE 30 MG: 30 INJECTION, SOLUTION INTRAMUSCULAR; INTRAVENOUS at 03:14

## 2023-01-03 NOTE — ED PROVIDER NOTES
Time: 1:43 AM EST  Date of encounter:  1/3/2023  Independent Historian/Clinical History and Information was obtained by:   Patient  Chief Complaint: Chest pain    History is limited by: N/A    History of Present Illness:  Patient is a 27 y.o. year old male who presents to the emergency department for evaluation of CP. Pt notes CP radiates to L shoulder. Pt notes CP is intermittent since Friday night. Pt notes CP happens when he moves around. Pt notes occasionally SOB during the CP episodes. Pt notes pain on inspiration. Pt denies recent illnesses. Pt notes CP improves when lying down. Pt reports palpitations.       History provided by:  Patient   used: No    Chest Pain  Pain location:  Substernal area  Pain quality: pressure and sharp    Pain radiates to:  L shoulder  Onset quality:  Sudden  Duration:  4 days  Chronicity:  New  Relieved by:  Rest  Associated symptoms: shortness of breath    Associated symptoms: no abdominal pain, no cough, no fever, no headache, no nausea, no palpitations and no vomiting        Patient Care Team  Primary Care Provider: Micha Sotelo DO    Past Medical History:     No Known Allergies  No past medical history on file.  No past surgical history on file.  No family history on file.    Home Medications:  Prior to Admission medications    Not on File        Social History:   Social History     Tobacco Use   • Smoking status: Never   • Smokeless tobacco: Never   Vaping Use   • Vaping Use: Never used   Substance Use Topics   • Alcohol use: Yes     Comment: occ, socially   • Drug use: Never         Review of Systems:  Review of Systems   Constitutional: Negative for chills and fever.   HENT: Negative for congestion, rhinorrhea and sore throat.    Eyes: Negative for pain and visual disturbance.   Respiratory: Positive for shortness of breath. Negative for apnea, cough and chest tightness.    Cardiovascular: Positive for chest pain. Negative for palpitations.    Gastrointestinal: Negative for abdominal pain, diarrhea, nausea and vomiting.   Genitourinary: Negative for difficulty urinating and dysuria.   Musculoskeletal: Negative for joint swelling and myalgias.   Skin: Negative for color change.   Neurological: Negative for seizures and headaches.   Psychiatric/Behavioral: Negative.    All other systems reviewed and are negative.       Physical Exam:  /85   Pulse 60   Temp 98.2 °F (36.8 °C) (Oral)   Resp 18   Ht 172.7 cm (68\")   Wt 65.5 kg (144 lb 6.4 oz)   SpO2 98%   BMI 21.96 kg/m²     Physical Exam  Vitals and nursing note reviewed.   Constitutional:       General: He is not in acute distress.     Appearance: Normal appearance. He is not toxic-appearing.   HENT:      Head: Normocephalic and atraumatic.      Jaw: There is normal jaw occlusion.   Eyes:      General: Lids are normal.      Extraocular Movements: Extraocular movements intact.      Conjunctiva/sclera: Conjunctivae normal.      Pupils: Pupils are equal, round, and reactive to light.   Cardiovascular:      Rate and Rhythm: Normal rate and regular rhythm.      Pulses: Normal pulses.      Heart sounds: Normal heart sounds.   Pulmonary:      Effort: Pulmonary effort is normal. No respiratory distress.      Breath sounds: Normal breath sounds. No wheezing or rhonchi.   Abdominal:      General: Abdomen is flat.      Palpations: Abdomen is soft.      Tenderness: There is no abdominal tenderness. There is no guarding or rebound.   Musculoskeletal:         General: Normal range of motion.      Cervical back: Normal range of motion and neck supple.      Right lower leg: No edema.      Left lower leg: No edema.   Skin:     General: Skin is warm and dry.   Neurological:      Mental Status: He is alert and oriented to person, place, and time. Mental status is at baseline.   Psychiatric:         Mood and Affect: Mood normal.                  Procedures:  Procedures      Medical Decision  Making:      Comorbidities that affect care:    None    External Notes reviewed:    None      The following orders were placed and all results were independently analyzed by me:  Orders Placed This Encounter   Procedures   • XR Chest 1 View   • [place] Draw   • Comprehensive Metabolic Panel   • Lipase   • BNP   • Magnesium   • CBC Auto Differential   • Scan Slide   • Undress & Gown   • Continuous Pulse Oximetry   • POC Troponin I   • POC Troponin I   • POC Troponin I   • ECG 12 Lead ED Triage Standing Order; Chest Pain   • CBC & Differential   • Green Top (Gel)   • Lavender Top   • Gold Top - SST   • Light Blue Top       Medications Given in the Emergency Department:  Medications   ketorolac (TORADOL) injection 30 mg (30 mg Intramuscular Given 1/3/23 0314)        ED Course:    ED Course as of 01/03/23 0751 Tue Jan 03, 2023 0207 EKG: Sinus rhythm 72, normal P wave, normal QRS, normal ST segment, normal QT, questionable U waves, no prior comparison [JS]      ED Course User Index  [JS] Dante Cunningham MD       Labs:    Lab Results (last 24 hours)     Procedure Component Value Units Date/Time    POC Troponin I with Hold Tube [[id number]] Collected: 01/03/23 0123    Specimen: Blood Updated: 01/03/23 0442    Narrative:      The following orders were created for panel order POC Troponin I with Hold Tube.  Procedure                               Abnormality         Status                     ---------                               -----------         ------                     POC Troponin I[[id number]]                                                              HOLD Troponin-I Tube[[id number]]                                                          Please view results for these tests on the individual orders.    CBC & Differential [[id number]]  (Abnormal) Collected: 01/03/23 0123    Specimen: Blood Updated: 01/03/23 0213    Narrative:      The following orders were created for panel order CBC & Differential.  Procedure                                Abnormality         Status                     ---------                               -----------         ------                     CBC Auto Differential[[id number]]        Abnormal            Final result               Scan Slide[[id number]]                                       Final result                 Please view results for these tests on the individual orders.    Comprehensive Metabolic Panel [[id number]]  (Abnormal) Collected: 01/03/23 0123    Specimen: Blood Updated: 01/03/23 0210     Glucose 114 mg/dL      BUN 15 mg/dL      Creatinine 1.01 mg/dL      Sodium 139 mmol/L      Potassium 3.5 mmol/L      Comment: Slight hemolysis detected by analyzer. Results may be affected.        Chloride 102 mmol/L      CO2 24.9 mmol/L      Calcium 9.2 mg/dL      Total Protein 7.1 g/dL      Albumin 4.4 g/dL      ALT (SGPT) 13 U/L      AST (SGOT) 13 U/L      Alkaline Phosphatase 119 U/L      Total Bilirubin 0.2 mg/dL      Globulin 2.7 gm/dL      A/G Ratio 1.6 g/dL      BUN/Creatinine Ratio 14.9     Anion Gap 12.1 mmol/L      eGFR 105.8 mL/min/1.73      Comment: National Kidney Foundation and American Society of Nephrology (ASN) Task Force recommended calculation based on the Chronic Kidney Disease Epidemiology Collaboration (CKD-EPI) equation refit without adjustment for race.       Narrative:      GFR Normal >60  Chronic Kidney Disease <60  Kidney Failure <15      Lipase [[id number]]  (Normal) Collected: 01/03/23 0123    Specimen: Blood Updated: 01/03/23 0210     Lipase 31 U/L     BNP [[id number]]  (Normal) Collected: 01/03/23 0123    Specimen: Blood Updated: 01/03/23 0208     proBNP <36.0 pg/mL     Narrative:      Among patients with dyspnea, NT-proBNP is highly sensitive for the detection of acute congestive heart failure. In addition NT-proBNP of <300 pg/ml effectively rules out acute congestive heart failure with 99% negative predictive value.      Magnesium [[id number]]  (Normal) Collected:  01/03/23 0123    Specimen: Blood Updated: 01/03/23 0210     Magnesium 1.8 mg/dL     CBC Auto Differential [[id number]]  (Abnormal) Collected: 01/03/23 0123    Specimen: Blood Updated: 01/03/23 0213     WBC 8.07 10*3/mm3      RBC 6.49 10*6/mm3      Hemoglobin 13.6 g/dL      Hematocrit 42.1 %      MCV 64.9 fL      MCH 21.0 pg      MCHC 32.3 g/dL      RDW 15.4 %      RDW-SD 33.2 fl      MPV 10.9 fL      Platelets 234 10*3/mm3      Neutrophil % 42.8 %      Lymphocyte % 46.0 %      Monocyte % 5.5 %      Eosinophil % 4.5 %      Basophil % 0.7 %      Immature Grans % 0.5 %      Neutrophils, Absolute 3.46 10*3/mm3      Lymphocytes, Absolute 3.71 10*3/mm3      Monocytes, Absolute 0.44 10*3/mm3      Eosinophils, Absolute 0.36 10*3/mm3      Basophils, Absolute 0.06 10*3/mm3      Immature Grans, Absolute 0.04 10*3/mm3      nRBC 0.0 /100 WBC     Scan Slide [[id number]] Collected: 01/03/23 0123    Specimen: Blood Updated: 01/03/23 0213     Anisocytosis Slight/1+     Microcytes Slight/1+     Macrocytes Slight/1+     Ovalocytes Slight/1+     WBC Morphology Normal     Platelet Estimate Adequate    POC Troponin I [[id number]]  (Normal) Collected: 01/03/23 0135    Specimen: Blood Updated: 01/03/23 0147     Troponin I 0.00 ng/mL      Comment: Serial Number: [id number]  [id number]       POC Troponin I [[id number]]  (Normal) Collected: 01/03/23 0308    Specimen: Blood Updated: 01/03/23 0320     Troponin I 0.00 ng/mL      Comment: Serial Number: [id number]:  [id number]              Imaging:    XR Chest 1 View    Result Date: 1/3/2023  PROCEDURE: XR CHEST 1 VW  COMPARISON: None.  INDICATIONS: Chest pain.  FINDINGS:  Two views (PA upright portable projections) of the chest reveal no cardiac enlargement and no acute infiltrate.  No pleural effusion or pneumothorax is seen.  External artifacts obscure detail, particularly projected over the right paraspinal lower cervical region.  The imaged airway is patent.        No acute infiltrate is  appreciated.  No cardiac enlargement.      Please note that portions of this note were completed with a voice recognition program.  NOA PITT JR, MD       Electronically Signed and Approved By: NOA PITT JR, MD on 1/03/2023 at 2:32                  Differential Diagnosis and Discussion:    Chest Pain:  Based on the patient's signs and symptoms, I considered aortic dissection, myocardial infaction, pulmonary embolism, cardiac tamponade, pericarditis, pneumothorax, musculoskeletal chest pain and other differential diagnosis as an etiology of the patient's chest pain.     All labs were reviewed and analyzed by me.  All X-rays were independently reviewed by me.  EKG was interpreted by me.    MDM  Number of Diagnoses or Management Options  Chest wall pain  Costochondritis  Diagnosis management comments: HEART SCORE  History: Slightly Suspicious (0)  ECG: Normal (0)  Age: Less than 45 years (0)  Risk factors: No Risk Factors (0)  Troponin: normal (0)    This patient's HEART score is 0.    According to the HEART Score Study: Score (Risk of adverse cardiac event in the next 14 days)  Scores 0-3: (0.9-1.7%) In the HEART Score study, these patients were discharged home.  Scores 4-6: (12-16.6%)  In the HEART Score study, these patients were admitted to the hospital.  Scores =7: (50-65%) In the HEART Score study, these patients were candidates for early invasive measures.   Final disposition is based on individual provider judgement and current clinical situation.    Patient has low risk heart score.  Nonischemic ECG.  Chest x-ray shows no acute cardiopulmonary process.  Patient is pain-free in the emergency department.  The patient´s CBC was reviewed and shows no abnormalities of critical concern. Of note, there is no anemia requiring a blood transfusion and the platelet count is acceptable.  The patient´s CMP was reviewed and shows no abnormalities of critical concern. Of note, the patient´s sodium and potassium are  acceptable. The patient´s liver enzymes are unremarkable. The patient´s renal function (creatinine) is preserved. The patient has a normal anion gap.  Patient provided with contact information for cardiology for close follow-up.  We discussed return precautions including worsening symptoms or any additional concerns.    Patient Progress  Patient progress: (Pt was informed of all test results and all questions answered)           Patient Care Considerations:    None I used the PERC score to risk stratify the patient for PE and a CT of the chest was considered but ultimately not indicated in today's visit.      Consultants/Shared Management Plan:    None    Social Determinants of Health:    Patient is independent, reliable, and has access to care.       Disposition and Care Coordination:    Discharged: I considered escalation of care by admitting this patient for observation, however the patient has improved and is suitable and  stable for discharge.    I have explained the patient´s condition, diagnoses and treatment plan based on the information available to me at this time. I have answered questions and addressed any concerns. The patient has a good  understanding of the patient´s diagnosis, condition, and treatment plan as can be expected at this point. The vital signs have been stable. The patient´s condition is stable and appropriate for discharge from the emergency department.      The patient will pursue further outpatient evaluation with the primary care physician or other designated or consulting physician as outlined in the discharge instructions. They are agreeable to this plan of care and follow-up instructions have been explained in detail. The patient has received these instructions in written format and have expressed an understanding of the discharge instructions. The patient is aware that any significant change in condition or worsening of symptoms should prompt an immediate return to this or the  closest emergency department or call to 911.    Final diagnoses:   Chest wall pain   Costochondritis        ED Disposition     ED Disposition   Discharge    Condition   Stable    Comment   --             This medical record created using voice recognition software.        Documentation assistance provided by Larry soto, acting as scribe for Dante Cunningham MD. Information recorded by the scribe was done at my direction and has been verified and validated by me.       Lrary Soto  01/03/23 0241       Larry Soto  01/03/23 0357       Dante Cunningham MD  01/03/23 0753

## 2023-01-06 LAB
QT INTERVAL: 389 MS
QT INTERVAL: 391 MS

## 2023-01-17 ENCOUNTER — OFFICE VISIT (OUTPATIENT)
Dept: UROLOGY | Facility: CLINIC | Age: 28
End: 2023-01-17
Payer: COMMERCIAL

## 2023-01-17 VITALS — HEIGHT: 68 IN | RESPIRATION RATE: 12 BRPM | BODY MASS INDEX: 21.73 KG/M2 | WEIGHT: 143.4 LBS

## 2023-01-17 DIAGNOSIS — N50.811 PAIN IN RIGHT TESTICLE: Primary | ICD-10-CM

## 2023-01-17 LAB
BILIRUB BLD-MCNC: NEGATIVE MG/DL
CLARITY, POC: CLEAR
COLOR UR: YELLOW
EXPIRATION DATE: 223
GLUCOSE UR STRIP-MCNC: NEGATIVE MG/DL
KETONES UR QL: NEGATIVE
LEUKOCYTE EST, POC: NEGATIVE
Lab: NORMAL
NITRITE UR-MCNC: NEGATIVE MG/ML
PH UR: 7 [PH] (ref 5–8)
PROT UR STRIP-MCNC: NEGATIVE MG/DL
RBC # UR STRIP: NEGATIVE /UL
SP GR UR: 1.02 (ref 1–1.03)
UROBILINOGEN UR QL: NORMAL

## 2023-01-17 PROCEDURE — 99203 OFFICE O/P NEW LOW 30 MIN: CPT | Performed by: NURSE PRACTITIONER

## 2023-01-17 PROCEDURE — 81003 URINALYSIS AUTO W/O SCOPE: CPT | Performed by: NURSE PRACTITIONER

## 2023-01-17 RX ORDER — LACTOBACILLUS ACIDOPHILUS 20B CELL
1 CAPSULE ORAL DAILY
Qty: 28 CAPSULE | Refills: 0 | Status: SHIPPED | OUTPATIENT
Start: 2023-01-17 | End: 2023-02-28

## 2023-01-17 RX ORDER — DOXYCYCLINE HYCLATE 100 MG/1
100 CAPSULE ORAL DAILY
Qty: 28 CAPSULE | Refills: 0 | Status: SHIPPED | OUTPATIENT
Start: 2023-01-17 | End: 2023-02-14

## 2023-01-17 NOTE — PROGRESS NOTES
"Chief Complaint: Testicle Pain (Pt here as a new pt.  Pt is having Right testicle pain.)    Subjective         History of Present Illness  Serge Peterson is a 27 y.o. male presents to [facility] to be seen for testicular pain.    Patient presents reporting he is having intermittent right testicle pain. He reports the pain typically lasts 1-2 hours with the exception of the episode in December when it lasted 12 hours and he presented to the ER. He reports the pain began after ejaculation. He did have similar pain in July that started after ejaculation and had an US completed.     In December the burning pain was in his right testicle and did not have any burning with urination.     Has been treated with Keflex in July for 1 week. His symptoms improved at that time, but would still have pain intermittently. The pain returned worse in December.     Does reports some intermittent burning with urination but this is not common for him.     Denies frequency except for 1 day in December when he had follow up with his PCP- he had a negative urinalysis at that time.         Objective     History reviewed. No pertinent past medical history.    History reviewed. No pertinent surgical history.      Current Outpatient Medications:   •  doxycycline (VIBRAMYCIN) 100 MG capsule, Take 1 capsule by mouth Daily for 28 days., Disp: 28 capsule, Rfl: 0  •  Lactobacillus (Florajen Acidophilus) capsule, Take 1 capsule by mouth Daily., Disp: 28 capsule, Rfl: 0    No Known Allergies     History reviewed. No pertinent family history.    Social History     Socioeconomic History   • Marital status: Single   Tobacco Use   • Smoking status: Never   • Smokeless tobacco: Never   Vaping Use   • Vaping Use: Never used   Substance and Sexual Activity   • Alcohol use: Yes     Comment: occ, socially   • Drug use: Never   • Sexual activity: Defer       Vital Signs:   Resp 12   Ht 172.7 cm (68\")   Wt 65 kg (143 lb 6.4 oz)   BMI " 21.80 kg/m²      Physical Exam  Vitals and nursing note reviewed.   Constitutional:       Appearance: Normal appearance.   Neurological:      General: No focal deficit present.      Mental Status: He is alert and oriented to person, place, and time.   Psychiatric:         Mood and Affect: Mood normal.         Behavior: Behavior normal.          Result Review :   The following data was reviewed by: EDELMIRA Alfaro on 01/17/2023:  Results for orders placed or performed in visit on 01/17/23   POC Urinalysis Dipstick, Automated    Specimen: Urine   Result Value Ref Range    Color Yellow Yellow, Straw, Dark Yellow, Lily    Clarity, UA Clear Clear    Specific Gravity  1.020 1.005 - 1.030    pH, Urine 7.0 5.0 - 8.0    Leukocytes Negative Negative    Nitrite, UA Negative Negative    Protein, POC Negative Negative mg/dL    Glucose, UA Negative Negative mg/dL    Ketones, UA Negative Negative    Urobilinogen, UA 0.2 E.U./dL Normal, 0.2 E.U./dL    Bilirubin Negative Negative    Blood, UA Negative Negative    Lot Number [id number]     Expiration Date 223        PROCEDURE:  US SCROTUM AND TESTICLES     COMPARISON: 7/20/2022.     INDICATIONS:  27-YEAR-OLD MALE W/ H/O RIGHT-SIDED TESTICULAR/SCROTAL PAIN.     TECHNIQUE:      The scrotum and testicles (testes) were evaluated by routine duplex ultrasound   examination, including two-dimensional grayscale images as well as color/spectral duplex Doppler   analysis.       FINDINGS:             TESTES:  Normal.  No visible mass.  Normal echotexture, size, and flow are noted.  The right testis   measures 4.4 x 1.8 x 2.6 cm.  The left testis measures 4.4 x 2.1 x 2.4 cm.  The right testicular   volume is approximately 10.7 mL. The left testicular volume is approximately 11.3 mL.     EPIDIDYMIDES:  No acute findings are seen involving the epididymides.  No acute epididymitis.      OTHER:  There is a small left hydrocele, which may contain debris.  A similar finding was seen    previously.  The previously seen fat-containing left-sided inguinal hernia is not clearly   appreciated on the current study.  No varicocele is appreciated bilaterally.  No definite   right-sided hydrocele.  No definite right-sided inguinal hernia.  No abnormalities of the scrotal   wall are appreciated.     IMPRESSION:                    1. No testicular torsion is identified.      2. No intrinsic or extrinsic testicular masses are seen.      3. No acute epididymo-orchitis is suggested.     4. There is a small left hydrocele with debris.  No right-sided hydrocele.     5.  Please see above comments for further detail.           Please note that portions of this note were completed with a voice recognition program.     NOA PITT JR, MD         Electronically Signed and Approved By: NOA PITT JR, MD on 12/04/2022 at 6:06         Narrative & Impression   PROCEDURE:  US TESTICULAR OR OVARIAN VASCULAR LIMITED     COMPARISON: None     INDICATIONS:  Right scrotal pain, rule out torsion     TECHNIQUE:    Testicular ultrasound.     FINDINGS:             The right testicle measures 4.2 cm. The left testicle measures 3.8 cm. The testicles have   homogeneous echogenicity. There is symmetric flow in the testicles on Doppler imaging. There is no   evidence of torsion. There is no evidence of intratesticular mass or abnormal calcification.  There   is a small left hydrocele.  There is a small fat containing left inguinal hernia.     IMPRESSION:  1. No evidence of intratesticular mass or torsion.     2. Small left hydrocele.  Small fat containing left inguinal hernia.     GRACIELA MARQUIS MD         Electronically Signed and Approved By: GRACIELA MARQUIS MD on 7/20/2022 at 12:29                    Procedures        Assessment and Plan    Diagnoses and all orders for this visit:    1. Pain in right testicle (Primary)  -     POC Urinalysis Dipstick, Automated  -     doxycycline (VIBRAMYCIN) 100 MG capsule; Take 1 capsule  by mouth Daily for 28 days.  Dispense: 28 capsule; Refill: 0  -     Lactobacillus (Florajen Acidophilus) capsule; Take 1 capsule by mouth Daily.  Dispense: 28 capsule; Refill: 0      Given history of recurrent right sided testicle pain after ejaculation that seemed to improve slightly with 1 week of antibiotics, will treat with a 28 day course of doxycycline along with floragen. Discussed risks and benefits of prolonged antibiotic use and sun exposure with doxycycline. Advised that since he is having some perineal pain this could be caused by an underlying prostatitis that would require a longer course of treatment to resolve. Will follow up in 6 weeks to see if his symptoms have resolved.        I spent 15 minutes caring for [facility] on this date of service. This time includes time spent by me in the following activities:reviewing tests, obtaining and/or reviewing a separately obtained history, performing a medically appropriate examination and/or evaluation , counseling and educating the patient/family/caregiver, ordering medications, tests, or procedures, and documenting information in the medical record  Follow Up   Return in about 6 weeks (around 2/28/2023).  Patient was given instructions and counseling regarding his condition or for health maintenance advice. Please see specific information pulled into the AVS if appropriate.         This document has been electronically signed by EDELMIRA Alfaro  January 17, 2023 11:12 EST

## 2023-01-25 ENCOUNTER — CONSULT (OUTPATIENT)
Dept: ONCOLOGY | Facility: HOSPITAL | Age: 28
End: 2023-01-25
Payer: COMMERCIAL

## 2023-01-25 VITALS
RESPIRATION RATE: 18 BRPM | OXYGEN SATURATION: 100 % | HEART RATE: 79 BPM | BODY MASS INDEX: 22.43 KG/M2 | WEIGHT: 147.49 LBS | TEMPERATURE: 98.6 F | DIASTOLIC BLOOD PRESSURE: 78 MMHG | SYSTOLIC BLOOD PRESSURE: 133 MMHG

## 2023-01-25 DIAGNOSIS — R71.8 MICROCYTOSIS: Primary | ICD-10-CM

## 2023-01-25 DIAGNOSIS — D56.0 THALASSEMIA, ALPHA: ICD-10-CM

## 2023-01-25 PROCEDURE — G0463 HOSPITAL OUTPT CLINIC VISIT: HCPCS | Performed by: NURSE PRACTITIONER

## 2023-01-25 PROCEDURE — 99204 OFFICE O/P NEW MOD 45 MIN: CPT | Performed by: NURSE PRACTITIONER

## 2023-01-25 NOTE — PROGRESS NOTES
Chief Complaint/Reason for Referral:  MICROCYTOSIS    Micha Sotelo*  Micha Sotelo, DO    Records Obtained:  Records of the patients history including those obtained from  Patient and EPIC were reviewed and summarized in detail.    Serge Peterson presents to [facility] for microcytosis    Subjective  HPI  Mr. Serge Peterson presents for new patient evaluation for microcytosis on referral from Dr. Micha Sotelo.     Per the patient, he reports was jaundiced at birth and reports he was told he has alpha thalassemia. Reports he is half [place] and half .  He also reports bilateral flank pain. Reports he has had intermittent right testicular pain. He went to the ER back in early December and had an US of the scrotum and testicles and no acute abnormalities were noted.  He saw urology yesterday and was told he likely has prostrate infection and was started on Doxycycline for 30 days and a prebiotic as well.     In regards to the microcytosis, this has been present since at least 2020 and likely longer. MCV ranges have been in the low to mid 60's. He has normal hemoglobin and hematocrit. He has normal WBC and platelet counts. Peripheral smear at ER visit on 12/27/22 showed:  1. Peripheral Blood Smear Reviews:                 A. Microcytosis.               B. Absolute atypical lymphocytosis.               C. Anisopoikilocytosis with several elliptocytes and target cells.               D. Rare Schistocytes.     COMMENT:    There is an absolute atypical lymphocytosis present.  Given the degree and morphology in the lymphocytes, a creative condition is favored.  There is also microcytosis with the anisopoikilocytosis with several different red cell forms.  Microcytosis can be seen in conditions of iron deficiency anemia, thalassemia of chronic disease, blood toxicity and sideroblastic anemia.  Target cells may be seen in liver disease post splenectomy,  thalassemia and hemoglobin C disease.  Lymphocytes may be seen in iron deficiency and can be seen in some hemoglobinopathies or hereditary elliptocytosis.  The Schistocytes suggest some possible  degree of intravascular hemolysis and an LDH and haptoglobin may be helpful.  Lastly, calculations of MCV over RBC does raise the possibility of a thalassemia.  Evaluation for thalassemia may be helpful..        Oncology/Hematology History    No history exists.       Review of Systems   Constitutional: Positive for fatigue. Negative for appetite change, diaphoresis, fever, unexpected weight gain and unexpected weight loss.   HENT: Negative for hearing loss, sore throat and voice change.    Eyes: Negative for blurred vision, double vision, pain, redness and visual disturbance.   Respiratory: Negative for cough, shortness of breath and wheezing.    Cardiovascular: Negative for chest pain, palpitations and leg swelling.   Endocrine: Negative for cold intolerance, heat intolerance, polydipsia and polyuria.   Genitourinary: Positive for flank pain. Negative for decreased urine volume, difficulty urinating, frequency and urinary incontinence.   Musculoskeletal: Negative for arthralgias, back pain, joint swelling and myalgias.   Skin: Negative for color change, rash, skin lesions and wound.   Neurological: Negative for dizziness, seizures, numbness and headache.   Hematological: Negative for adenopathy. Does not bruise/bleed easily.   Psychiatric/Behavioral: Negative for depressed mood. The patient is not nervous/anxious.    All other systems reviewed and are negative.      Current Outpatient Medications on File Prior to Visit   Medication Sig Dispense Refill   • doxycycline (VIBRAMYCIN) 100 MG capsule Take 1 capsule by mouth Daily for 28 days. 28 capsule 0   • Lactobacillus (Florajen Acidophilus) capsule Take 1 capsule by mouth Daily. 28 capsule 0     No current facility-administered medications on file prior to visit.       No  Known Allergies  History reviewed. No pertinent past medical history.  History reviewed. No pertinent surgical history.  Social History     Socioeconomic History   • Marital status: Single   Tobacco Use   • Smoking status: Never   • Smokeless tobacco: Never   Vaping Use   • Vaping Use: Never used   Substance and Sexual Activity   • Alcohol use: Yes     Comment: occ, socially   • Drug use: Never   • Sexual activity: Defer     History reviewed. No pertinent family history.  Immunization History   Administered Date(s) Administered   • COVID-19 (MODERNA) 1st, 2nd, 3rd Dose Only 06/27/2021, 07/25/2021   • COVID-19 (MODERNA) BOOSTER 01/28/2022   • Fluzone Quad >6mos (Multi-dose) 01/31/2020       Tobacco Use: Low Risk    • Smoking Tobacco Use: Never   • Smokeless Tobacco Use: Never   • Passive Exposure: Not on file       Objective     Physical Exam  Vitals and nursing note reviewed.   Constitutional:       Appearance: Normal appearance. He is normal weight.   HENT:      Head: Normocephalic.      Nose: Nose normal.      Mouth/Throat:      Mouth: Mucous membranes are moist.   Eyes:      Pupils: Pupils are equal, round, and reactive to light.   Cardiovascular:      Rate and Rhythm: Normal rate and regular rhythm.      Pulses: Normal pulses.      Heart sounds: Normal heart sounds.   Pulmonary:      Effort: Pulmonary effort is normal.      Breath sounds: Normal breath sounds.   Abdominal:      General: Bowel sounds are normal.      Palpations: Abdomen is soft.   Musculoskeletal:         General: Normal range of motion.      Cervical back: Normal range of motion and neck supple.   Skin:     General: Skin is warm and dry.      Capillary Refill: Capillary refill takes less than 2 seconds.   Neurological:      General: No focal deficit present.      Mental Status: He is alert and oriented to person, place, and time.   Psychiatric:         Mood and Affect: Mood normal.         Behavior: Behavior normal.         Thought Content:  Thought content normal.         Judgment: Judgment normal.         Vitals:    01/25/23 1256   BP: 133/78   Pulse: 79   Resp: 18   Temp: 98.6 °F (37 °C)   TempSrc: Temporal   SpO2: 100%   Weight: 66.9 kg (147 lb 7.8 oz)   PainSc:   4   PainLoc: Abdomen  Comment: FLANK PAIN       Wt Readings from Last 3 Encounters:   01/25/23 66.9 kg (147 lb 7.8 oz)   01/17/23 65 kg (143 lb 6.4 oz)   01/03/23 65.5 kg (144 lb 6.4 oz)        BMI is within normal parameters. No other follow-up for BMI required.       ECOG score: 0         ECOG: (0) Fully Active - Able to Carry On All Pre-disease Performance Without Restriction  Fall Risk Assessment was completed, and patient is at low risk for falls.  PHQ-9 Total Score:         The patient is  experiencing fatigue. Fatigue score: 4    PT/OT Functional Screening: PT fx screen: No needs identified  Speech Functional Screening: Speech fx screen: No needs identified  Rehab to be ordered: Rehab to be ordered: No needs identified        Result Review :  The following data was reviewed by: EDELMIRA Johnson on 01/25/2023:  Lab Results   Component Value Date    HGB 13.6 01/03/2023    HCT 42.1 01/03/2023    MCV 64.9 (L) 01/03/2023     01/03/2023    WBC 8.07 01/03/2023    NEUTROABS 3.46 01/03/2023    LYMPHSABS 3.71 (H) 01/03/2023    MONOSABS 0.44 01/03/2023    EOSABS 0.36 01/03/2023    BASOSABS 0.06 01/03/2023     Lab Results   Component Value Date    GLUCOSE 114 (H) 01/03/2023    BUN 15 01/03/2023    CREATININE 1.01 01/03/2023     01/03/2023    K 3.5 01/03/2023     01/03/2023    CO2 24.9 01/03/2023    CALCIUM 9.2 01/03/2023    PROTEINTOT 7.1 01/03/2023    ALBUMIN 4.4 01/03/2023    BILITOT 0.2 01/03/2023    ALKPHOS 119 (H) 01/03/2023    AST 13 01/03/2023    ALT 13 01/03/2023       US Scrotum & Testicles    Result Date: 12/4/2022     1. No testicular torsion is identified.  2. No intrinsic or extrinsic testicular masses are seen.  3. No acute epididymo-orchitis is  suggested.  4. There is a small left hydrocele with debris.  No right-sided hydrocele.  5.  Please see above comments for further detail.    Please note that portions of this note were completed with a voice recognition program.  NOA PITT JR, MD       Electronically Signed and Approved By: NOA PITT JR, MD on 12/04/2022 at 6:06              XR Chest 1 View    Result Date: 1/3/2023    No acute infiltrate is appreciated.  No cardiac enlargement.      Please note that portions of this note were completed with a voice recognition program.  NOA PITT JR, MD       Electronically Signed and Approved By: NOA PITT JR, MD on 1/03/2023 at 2:32                 Assessment and Plan:  Diagnoses and all orders for this visit:    1. Microcytosis (Primary)  -     CBC & Differential; Future  -     Lactate Dehydrogenase; Future  -     Haptoglobin; Future  -     Peripheral Blood Smear; Future    2. Thalassemia, alpha (HCC)    History of microcytosis on CBC. Has been stable for several years dating back to 2020. Likely has had since he was born. Reports history of alpha thalassemia as reported by patient by his mother.  Hemoglobin and hematocrit are in the normal range. Denies any SOA, heart palpitations or worsening fatigue. Denies any GI blood loss. Microcytosis is secondary to the alpha thalassemia. Peripheral smear on 12/27/22 showed rare schistocytes and atypical lymphocytosis and the atypical lymphocytosis was felt to be reactive. Will check LDH and haptoglobin to evaluate for underlying hemolysis which is likely secondary to the alpha thalassemia. Hemoglobin electrophoresis did not show any abnormalities. Iron studies were normal.     I spent 20 minutes caring for [facility] on this date of service. This time includes time spent by me in the following activities:preparing for the visit, reviewing tests, obtaining and/or reviewing a separately obtained history, performing a medically appropriate examination and/or  evaluation , counseling and educating the patient/family/caregiver, ordering medications, tests, or procedures, referring and communicating with other health care professionals , documenting information in the medical record, independently interpreting results and communicating that information with the patient/family/caregiver and care coordination    Patient Follow Up: labs in 1 month and follow up in 6 weeks.     Patient was given instructions and counseling regarding his condition or for health maintenance advice. Please see specific information pulled into the AVS if appropriate.     Ny Tracey, APRN    1/25/2023    .tob

## 2023-02-22 ENCOUNTER — LAB (OUTPATIENT)
Dept: ONCOLOGY | Facility: HOSPITAL | Age: 28
End: 2023-02-22
Payer: COMMERCIAL

## 2023-02-22 DIAGNOSIS — R71.8 MICROCYTOSIS: ICD-10-CM

## 2023-02-22 LAB
ANISOCYTOSIS BLD QL: ABNORMAL
BASOPHILS # BLD AUTO: 0.04 10*3/MM3 (ref 0–0.2)
BASOPHILS # BLD MANUAL: 0.15 10*3/MM3 (ref 0–0.2)
BASOPHILS NFR BLD AUTO: 0.5 % (ref 0–1.5)
BASOPHILS NFR BLD MANUAL: 2 % (ref 0–1.5)
BURR CELLS BLD QL SMEAR: ABNORMAL
DEPRECATED RDW RBC AUTO: 33.2 FL (ref 37–54)
EOSINOPHIL # BLD AUTO: 0.27 10*3/MM3 (ref 0–0.4)
EOSINOPHIL NFR BLD AUTO: 3.6 % (ref 0.3–6.2)
ERYTHROCYTE [DISTWIDTH] IN BLOOD BY AUTOMATED COUNT: 16.4 % (ref 12.3–15.4)
HAPTOGLOB SERPL-MCNC: 96 MG/DL (ref 30–200)
HCT VFR BLD AUTO: 41.3 % (ref 37.5–51)
HGB BLD-MCNC: 13.3 G/DL (ref 13–17.7)
HYPOCHROMIA BLD QL: ABNORMAL
IMM GRANULOCYTES # BLD AUTO: 0.05 10*3/MM3 (ref 0–0.05)
IMM GRANULOCYTES NFR BLD AUTO: 0.7 % (ref 0–0.5)
LARGE PLATELETS: ABNORMAL
LDH SERPL-CCNC: 177 U/L (ref 135–225)
LYMPHOCYTES # BLD AUTO: 2.26 10*3/MM3 (ref 0.7–3.1)
LYMPHOCYTES # BLD MANUAL: 2.72 10*3/MM3 (ref 0.7–3.1)
LYMPHOCYTES NFR BLD AUTO: 29.9 % (ref 19.6–45.3)
LYMPHOCYTES NFR BLD MANUAL: 7 % (ref 5–12)
MCH RBC QN AUTO: 20.6 PG (ref 26.6–33)
MCHC RBC AUTO-ENTMCNC: 32.2 G/DL (ref 31.5–35.7)
MCV RBC AUTO: 63.8 FL (ref 79–97)
MICROCYTES BLD QL: ABNORMAL
MONOCYTES # BLD AUTO: 0.51 10*3/MM3 (ref 0.1–0.9)
MONOCYTES # BLD: 0.53 10*3/MM3 (ref 0.1–0.9)
MONOCYTES NFR BLD AUTO: 6.7 % (ref 5–12)
NEUTROPHILS # BLD AUTO: 4.16 10*3/MM3 (ref 1.7–7)
NEUTROPHILS NFR BLD AUTO: 4.43 10*3/MM3 (ref 1.7–7)
NEUTROPHILS NFR BLD AUTO: 58.6 % (ref 42.7–76)
NEUTROPHILS NFR BLD MANUAL: 55 % (ref 42.7–76)
PATHOLOGY REVIEW: YES
PLATELET # BLD AUTO: 266 10*3/MM3 (ref 140–450)
PMV BLD AUTO: 9.2 FL (ref 6–12)
POIKILOCYTOSIS BLD QL SMEAR: ABNORMAL
RBC # BLD AUTO: 6.47 10*6/MM3 (ref 4.14–5.8)
SMALL PLATELETS BLD QL SMEAR: ADEQUATE
VARIANT LYMPHS NFR BLD MANUAL: 36 % (ref 19.6–45.3)
WBC MORPH BLD: NORMAL
WBC NRBC COR # BLD: 7.56 10*3/MM3 (ref 3.4–10.8)

## 2023-02-22 PROCEDURE — 83010 ASSAY OF HAPTOGLOBIN QUANT: CPT

## 2023-02-22 PROCEDURE — 83615 LACTATE (LD) (LDH) ENZYME: CPT

## 2023-02-22 PROCEDURE — 36415 COLL VENOUS BLD VENIPUNCTURE: CPT

## 2023-02-22 PROCEDURE — 85025 COMPLETE CBC W/AUTO DIFF WBC: CPT

## 2023-02-23 LAB
CYTO UR: NORMAL
LAB AP CASE REPORT: NORMAL
LAB AP CLINICAL INFORMATION: NORMAL
PATH REPORT.FINAL DX SPEC: NORMAL
PATH REPORT.GROSS SPEC: NORMAL

## 2023-02-28 ENCOUNTER — OFFICE VISIT (OUTPATIENT)
Dept: UROLOGY | Facility: CLINIC | Age: 28
End: 2023-02-28
Payer: COMMERCIAL

## 2023-02-28 VITALS — RESPIRATION RATE: 14 BRPM | WEIGHT: 147 LBS | BODY MASS INDEX: 22.28 KG/M2 | HEIGHT: 68 IN

## 2023-02-28 DIAGNOSIS — N50.811 PAIN IN RIGHT TESTICLE: Primary | ICD-10-CM

## 2023-02-28 LAB
BILIRUB BLD-MCNC: NEGATIVE MG/DL
CLARITY, POC: CLEAR
COLOR UR: YELLOW
EXPIRATION DATE: NORMAL
GLUCOSE UR STRIP-MCNC: NEGATIVE MG/DL
KETONES UR QL: NEGATIVE
LEUKOCYTE EST, POC: NEGATIVE
Lab: NORMAL
NITRITE UR-MCNC: NEGATIVE MG/ML
PH UR: 5.5 [PH] (ref 5–8)
PROT UR STRIP-MCNC: NEGATIVE MG/DL
RBC # UR STRIP: NEGATIVE /UL
SP GR UR: 1.03 (ref 1–1.03)
UROBILINOGEN UR QL: NORMAL

## 2023-02-28 PROCEDURE — 81003 URINALYSIS AUTO W/O SCOPE: CPT | Performed by: NURSE PRACTITIONER

## 2023-02-28 PROCEDURE — 99212 OFFICE O/P EST SF 10 MIN: CPT | Performed by: NURSE PRACTITIONER

## 2023-02-28 NOTE — PROGRESS NOTES
"Chief Complaint: Testicle Pain (F/u from doxycycline treatment )    Subjective         History of Present Illness  Serge Peterson is a 27 y.o. male presents to [facility] to be seen for testicular pain follow-up.    At last visit we treated the patient with 4 weeks of doxycycline to see if this would help to alleviate his post ejaculate pain and testicular pain.    He completed antibiotics 2 weeks ago.     He is still with a dull aching pain but much improved from baseline.     He has no further pain with ejaculation.        Objective     History reviewed. No pertinent past medical history.    History reviewed. No pertinent surgical history.    No current outpatient medications on file.    No Known Allergies     History reviewed. No pertinent family history.    Social History     Socioeconomic History   • Marital status: Single   Tobacco Use   • Smoking status: Never   • Smokeless tobacco: Never   Vaping Use   • Vaping Use: Never used   Substance and Sexual Activity   • Alcohol use: Yes     Comment: occ, socially   • Drug use: Never   • Sexual activity: Defer       Vital Signs:   Resp 14   Ht 172.7 cm (68\")   Wt 66.7 kg (147 lb)   BMI 22.35 kg/m²      Physical Exam     Result Review :   The following data was reviewed by: EDELMIRA Resendiz on 02/28/2023:  Results for orders placed or performed in visit on 02/28/23   POC Urinalysis Dipstick, Automated    Specimen: Urine   Result Value Ref Range    Color Yellow Yellow, Straw, Dark Yellow, Lily    Clarity, UA Clear Clear    Specific Gravity  1.030 1.005 - 1.030    pH, Urine 5.5 5.0 - 8.0    Leukocytes Negative Negative    Nitrite, UA Negative Negative    Protein, POC Negative Negative mg/dL    Glucose, UA Negative Negative mg/dL    Ketones, UA Negative Negative    Urobilinogen, UA 0.2 E.U./dL Normal, 0.2 E.U./dL    Bilirubin Negative Negative    Blood, UA Negative Negative    Lot Number [id number]     Expiration Date 4/2,024     "         Procedures        Assessment and Plan    Diagnoses and all orders for this visit:    1. Pain in right testicle (Primary)  -     POC Urinalysis Dipstick, Automated      We will have the patient f/u in 3 months as he is much improved.     If new or worsening symptoms he will call the office to be seen sooner.          I spent 10 minutes caring for Serge on this date of service. This time includes time spent by me in the following activities:reviewing tests, obtaining and/or reviewing a separately obtained history, performing a medically appropriate examination and/or evaluation , counseling and educating the patient/family/caregiver, ordering medications, tests, or procedures, and documenting information in the medical record  Follow Up   Return in about 3 months (around 5/28/2023) for f/u testicular pain .  Patient was given instructions and counseling regarding his condition or for health maintenance advice. Please see specific information pulled into the AVS if appropriate.         This document has been electronically signed by EDELMIRA Resendiz  February 28, 2023 12:30 EST

## 2023-03-01 ENCOUNTER — TELEPHONE (OUTPATIENT)
Dept: ONCOLOGY | Facility: HOSPITAL | Age: 28
End: 2023-03-01
Payer: COMMERCIAL

## 2023-03-21 ENCOUNTER — OFFICE VISIT (OUTPATIENT)
Dept: FAMILY MEDICINE CLINIC | Facility: CLINIC | Age: 28
End: 2023-03-21
Payer: COMMERCIAL

## 2023-03-21 VITALS
DIASTOLIC BLOOD PRESSURE: 77 MMHG | HEART RATE: 76 BPM | WEIGHT: 146.8 LBS | TEMPERATURE: 98.2 F | BODY MASS INDEX: 22.25 KG/M2 | SYSTOLIC BLOOD PRESSURE: 130 MMHG | HEIGHT: 68 IN | OXYGEN SATURATION: 100 %

## 2023-03-21 DIAGNOSIS — L81.9 HYPOPIGMENTATION: Primary | ICD-10-CM

## 2023-03-21 PROCEDURE — 99213 OFFICE O/P EST LOW 20 MIN: CPT | Performed by: FAMILY MEDICINE

## 2023-03-21 RX ORDER — TERBINAFINE HYDROCHLORIDE 250 MG/1
250 TABLET ORAL DAILY
Qty: 28 TABLET | Refills: 0 | Status: SHIPPED | OUTPATIENT
Start: 2023-03-21 | End: 2023-04-18

## 2023-03-21 NOTE — PROGRESS NOTES
Chief Complaint   Patient presents with   • spot on chest         Subjective     Serge Peterson  has no past medical history on file.    Skin lesion-he states about a month ago he developed these 3 lesions on his upper mid sternum.  He describes them as somewhat scaly.  They have somewhat gone but the area has remained somewhat pale.  He states several years ago though this is an area that had been sunburned.      PHQ-2 Depression Screening  Little interest or pleasure in doing things?     Feeling down, depressed, or hopeless?     PHQ-2 Total Score     PHQ-9 Depression Screening  Little interest or pleasure in doing things?     Feeling down, depressed, or hopeless?     Trouble falling or staying asleep, or sleeping too much?     Feeling tired or having little energy?     Poor appetite or overeating?     Feeling bad about yourself - or that you are a failure or have let yourself or your family down?     Trouble concentrating on things, such as reading the newspaper or watching television?     Moving or speaking so slowly that other people could have noticed? Or the opposite - being so fidgety or restless that you have been moving around a lot more than usual?     Thoughts that you would be better off dead, or of hurting yourself in some way?     PHQ-9 Total Score     If you checked off any problems, how difficult have these problems made it for you to do your work, take care of things at home, or get along with other people?       No Known Allergies    Prior to Admission medications    Not on File        Patient Active Problem List   Diagnosis   • Pain in right testicle   • Non-recurrent unilateral inguinal hernia without obstruction or gangrene   • Bilateral impacted cerumen   • Lumbar strain, initial encounter   • Hypopigmentation        History reviewed. No pertinent surgical history.    Social History     Socioeconomic History   • Marital status: Single   Tobacco Use   • Smoking status: Never   • Smokeless  "tobacco: Never   Vaping Use   • Vaping Use: Never used   Substance and Sexual Activity   • Alcohol use: Yes     Comment: occ, socially   • Drug use: Never   • Sexual activity: Defer       History reviewed. No pertinent family history.    Family history, surgical history, past medical history, Allergies and meds reviewed with patient today and updated in [facility] EMR.     ROS:  Review of Systems   Skin: Positive for skin lesions.       OBJECTIVE:  Vitals:    03/21/23 1140   BP: 130/77   BP Location: Right arm   Patient Position: Sitting   Pulse: 76   Temp: 98.2 °F (36.8 °C)   SpO2: 100%   Weight: 66.6 kg (146 lb 12.8 oz)   Height: 172.7 cm (68\")     No results found.   Body mass index is 22.32 kg/m².  No LMP for male patient.    Physical Exam  Vitals and nursing note reviewed.   Constitutional:       General: He is not in acute distress.     Appearance: Normal appearance. He is normal weight.   HENT:      Head: Normocephalic.   Skin:     General: Skin is warm.             Comments: Hypopigmentation   Neurological:      Mental Status: He is alert.         Procedures    Office Visit on 02/28/2023   Component Date Value Ref Range Status   • Color 02/28/2023 Yellow  Yellow, Straw, Dark Yellow, Lily Final   • Clarity, UA 02/28/2023 Clear  Clear Final   • Specific Gravity  02/28/2023 1.030  1.005 - 1.030 Final   • pH, Urine 02/28/2023 5.5  5.0 - 8.0 Final   • Leukocytes 02/28/2023 Negative  Negative Final   • Nitrite, UA 02/28/2023 Negative  Negative Final   • Protein, POC 02/28/2023 Negative  Negative mg/dL Final   • Glucose, UA 02/28/2023 Negative  Negative mg/dL Final   • Ketones, UA 02/28/2023 Negative  Negative Final   • Urobilinogen, UA 02/28/2023 0.2 E.U./dL  Normal, 0.2 E.U./dL Final   • Bilirubin 02/28/2023 Negative  Negative Final   • Blood, UA 02/28/2023 Negative  Negative Final   • Lot Number 02/28/2023 [id number]   Final   • Expiration Date 02/28/2023 4/2,024   Final   Lab on 02/22/2023   Component Date Value Ref " Range Status   • LDH 02/22/2023 177  135 - 225 U/L Final   • Haptoglobin 02/22/2023 96  30 - 200 mg/dL Final   • Pathology Review 02/22/2023 Yes   Final   • WBC 02/22/2023 7.56  3.40 - 10.80 10*3/mm3 Final   • RBC 02/22/2023 6.47 (H)  4.14 - 5.80 10*6/mm3 Final   • Hemoglobin 02/22/2023 13.3  13.0 - 17.7 g/dL Final   • Hematocrit 02/22/2023 41.3  37.5 - 51.0 % Final   • MCV 02/22/2023 63.8 (L)  79.0 - 97.0 fL Final   • MCH 02/22/2023 20.6 (L)  26.6 - 33.0 pg Final   • MCHC 02/22/2023 32.2  31.5 - 35.7 g/dL Final   • RDW 02/22/2023 16.4 (H)  12.3 - 15.4 % Final   • RDW-SD 02/22/2023 33.2 (L)  37.0 - 54.0 fl Final   • MPV 02/22/2023 9.2  6.0 - 12.0 fL Final   • Platelets 02/22/2023 266  140 - 450 10*3/mm3 Final   • Neutrophil % 02/22/2023 58.6  42.7 - 76.0 % Final   • Lymphocyte % 02/22/2023 29.9  19.6 - 45.3 % Final   • Monocyte % 02/22/2023 6.7  5.0 - 12.0 % Final   • Eosinophil % 02/22/2023 3.6  0.3 - 6.2 % Final   • Basophil % 02/22/2023 0.5  0.0 - 1.5 % Final   • Immature Grans % 02/22/2023 0.7 (H)  0.0 - 0.5 % Final   • Neutrophils, Absolute 02/22/2023 4.43  1.70 - 7.00 10*3/mm3 Final   • Lymphocytes, Absolute 02/22/2023 2.26  0.70 - 3.10 10*3/mm3 Final   • Monocytes, Absolute 02/22/2023 0.51  0.10 - 0.90 10*3/mm3 Final   • Eosinophils, Absolute 02/22/2023 0.27  0.00 - 0.40 10*3/mm3 Final   • Basophils, Absolute 02/22/2023 0.04  0.00 - 0.20 10*3/mm3 Final   • Immature Grans, Absolute 02/22/2023 0.05  0.00 - 0.05 10*3/mm3 Final   • Neutrophil % 02/22/2023 55.0  42.7 - 76.0 % Final   • Lymphocyte % 02/22/2023 36.0  19.6 - 45.3 % Final   • Monocyte % 02/22/2023 7.0  5.0 - 12.0 % Final   • Basophil % 02/22/2023 2.0 (H)  0.0 - 1.5 % Final   • Neutrophils Absolute 02/22/2023 4.16  1.70 - 7.00 10*3/mm3 Final   • Lymphocytes Absolute 02/22/2023 2.72  0.70 - 3.10 10*3/mm3 Final   • Monocytes Absolute 02/22/2023 0.53  0.10 - 0.90 10*3/mm3 Final   • Basophils Absolute 02/22/2023 0.15  0.00 - 0.20 10*3/mm3 Final   •  Anisocytosis 02/22/2023 Slight/1+  None Seen Final   • [place] Cells 02/22/2023 Slight/1+  None Seen Final   • Hypochromia 02/22/2023 Slight/1+  None Seen Final   • Microcytes 02/22/2023 Slight/1+  None Seen Final   • Poikilocytes 02/22/2023 Slight/1+  None Seen Final   • WBC Morphology 02/22/2023 Normal  Normal Final   • Platelet Estimate 02/22/2023 Adequate  Normal Final   • Large Platelets 02/22/2023 Slight/1+  None Seen Final   • Final Diagnosis 02/22/2023    Final                    Value:This result contains rich text formatting which cannot be displayed here.   • Clinical Information 02/22/2023    Final                    Value:This result contains rich text formatting which cannot be displayed here.   • Gross Description 02/22/2023    Final                    Value:This result contains rich text formatting which cannot be displayed here.   • Microscopic Description 02/22/2023    Final    This result contains rich text formatting which cannot be displayed here.   • Case Report 02/22/2023    Final                    Value:Surgical Pathology Report                         Case: [id number]                                  Authorizing Provider:  Ny Tracey,     Collected:           02/22/2023 01:05 PM                                 APRN                                                                         Ordering Location:     [facility]     Received:            02/23/2023 07:53 AM                                 GROUP HEMATOLOGY &                                                                                  ONCOLOGY                                                                     Pathologist:           Osman Blackmon MD                                                            Specimen:    Blood, Venous, PBS                                                                            ASSESSMENT/ PLAN:    Diagnoses and all orders for this visit:    1. Hypopigmentation  (Primary)  Assessment & Plan:  This area is most likely some tenia versicolor.  We will give him a course of an antifungal and use some Nizoral shampoo to this area 3 days a week.  Instructed that he needs to lather it up to the area leave it on 8 to 10 minutes for rinsing it off.      Other orders  -     terbinafine (lamiSIL) 250 MG tablet; Take 1 tablet by mouth Daily for 28 days.  Dispense: 28 tablet; Refill: 0      Orders Placed Today:     New Medications Ordered This Visit   Medications   • terbinafine (lamiSIL) 250 MG tablet     Sig: Take 1 tablet by mouth Daily for 28 days.     Dispense:  28 tablet     Refill:  0        Management Plan:     An After Visit Summary was printed and given to the patient at discharge.    Follow-up: Return in about 3 months (around 6/21/2023) for Recheck.    Micha Sotelo,  3/21/2023 11:59 EDT  This note was electronically signed.

## 2023-03-21 NOTE — ASSESSMENT & PLAN NOTE
This area is most likely some tenia versicolor.  We will give him a course of an antifungal and use some Nizoral shampoo to this area 3 days a week.  Instructed that he needs to lather it up to the area leave it on 8 to 10 minutes for rinsing it off.

## 2023-05-25 ENCOUNTER — OFFICE VISIT (OUTPATIENT)
Dept: UROLOGY | Facility: CLINIC | Age: 28
End: 2023-05-25
Payer: COMMERCIAL

## 2023-05-25 VITALS — RESPIRATION RATE: 14 BRPM | WEIGHT: 153.2 LBS | HEIGHT: 68 IN | BODY MASS INDEX: 23.22 KG/M2

## 2023-05-25 DIAGNOSIS — N50.811 PAIN IN RIGHT TESTICLE: Primary | ICD-10-CM

## 2023-05-25 NOTE — PROGRESS NOTES
"Chief Complaint: Testicle Pain    Subjective         History of Present Illness  Serge Peterson is a 27 y.o. male presents to [facility] to be seen for testicular pain follow-up.    He is no longer with a dull aching pain.    He has had one episode of pain with ejaculation.    Good stream.     No hesitancy or straining to Urinate.      Objective     History reviewed. No pertinent past medical history.    History reviewed. No pertinent surgical history.    No current outpatient medications on file.    No Known Allergies     History reviewed. No pertinent family history.    Social History     Socioeconomic History   • Marital status: Single   Tobacco Use   • Smoking status: Never   • Smokeless tobacco: Never   Vaping Use   • Vaping Use: Never used   Substance and Sexual Activity   • Alcohol use: Yes     Comment: occ, socially   • Drug use: Never   • Sexual activity: Defer       Vital Signs:   Resp 14   Ht 172.7 cm (68\")   Wt 69.5 kg (153 lb 3.2 oz)   BMI 23.29 kg/m²      Physical Exam     Result Review :   The following data was reviewed by: EDELMIRA Resendiz on 02/28/2023:  Results for orders placed or performed in visit on 02/28/23   POC Urinalysis Dipstick, Automated    Specimen: Urine   Result Value Ref Range    Color Yellow Yellow, Straw, Dark Yellow, Lily    Clarity, UA Clear Clear    Specific Gravity  1.030 1.005 - 1.030    pH, Urine 5.5 5.0 - 8.0    Leukocytes Negative Negative    Nitrite, UA Negative Negative    Protein, POC Negative Negative mg/dL    Glucose, UA Negative Negative mg/dL    Ketones, UA Negative Negative    Urobilinogen, UA 0.2 E.U./dL Normal, 0.2 E.U./dL    Bilirubin Negative Negative    Blood, UA Negative Negative    Lot Number [id number]     Expiration Date 4/2,024             Procedures        Assessment and Plan    Diagnoses and all orders for this visit:    1. Pain in right testicle (Primary)      Patient doing well at this time and is pain free.     He " will f/u as needed.           I spent 10 minutes caring for Serge on this date of service. This time includes time spent by me in the following activities:reviewing tests, obtaining and/or reviewing a separately obtained history, performing a medically appropriate examination and/or evaluation , counseling and educating the patient/family/caregiver, ordering medications, tests, or procedures, and documenting information in the medical record  Follow Up   Return if symptoms worsen or fail to improve.  Patient was given instructions and counseling regarding his condition or for health maintenance advice. Please see specific information pulled into the AVS if appropriate.         This document has been electronically signed by Kaelyn Pop, EDELMIRA  May 25, 2023 08:28 EDT

## 2023-12-15 ENCOUNTER — OFFICE VISIT (OUTPATIENT)
Dept: FAMILY MEDICINE CLINIC | Facility: CLINIC | Age: 28
End: 2023-12-15
Payer: COMMERCIAL

## 2023-12-15 VITALS
DIASTOLIC BLOOD PRESSURE: 85 MMHG | TEMPERATURE: 97.9 F | HEIGHT: 68 IN | WEIGHT: 162 LBS | SYSTOLIC BLOOD PRESSURE: 136 MMHG | OXYGEN SATURATION: 99 % | HEART RATE: 87 BPM | BODY MASS INDEX: 24.55 KG/M2

## 2023-12-15 DIAGNOSIS — K62.89 RECTAL PAIN: Primary | ICD-10-CM

## 2023-12-15 PROCEDURE — 99213 OFFICE O/P EST LOW 20 MIN: CPT | Performed by: FAMILY MEDICINE

## 2023-12-15 NOTE — PROGRESS NOTES
Chief Complaint   Patient presents with    Rectal Pain        Subjective     Serge Peterson  has no past medical history on file.    Rectal pain-he did notice some rectal pain for the last couple months.  It initially started while he was at the gym and doing some leg presses.  He states that it would occur intermittently.  He never had any pain with having a bowel movement or shortly thereafter.  Since he stopped doing leg presses it has essentially resolved.  He has not had any bloody or melanotic stools.  He does not have any chronic constipation or diarrhea.        PHQ-2 Depression Screening  Little interest or pleasure in doing things?     Feeling down, depressed, or hopeless?     PHQ-2 Total Score     PHQ-9 Depression Screening  Little interest or pleasure in doing things?     Feeling down, depressed, or hopeless?     Trouble falling or staying asleep, or sleeping too much?     Feeling tired or having little energy?     Poor appetite or overeating?     Feeling bad about yourself - or that you are a failure or have let yourself or your family down?     Trouble concentrating on things, such as reading the newspaper or watching television?     Moving or speaking so slowly that other people could have noticed? Or the opposite - being so fidgety or restless that you have been moving around a lot more than usual?     Thoughts that you would be better off dead, or of hurting yourself in some way?     PHQ-9 Total Score     If you checked off any problems, how difficult have these problems made it for you to do your work, take care of things at home, or get along with other people?       No Known Allergies    Prior to Admission medications    Not on File        Patient Active Problem List   Diagnosis    Pain in right testicle    Non-recurrent unilateral inguinal hernia without obstruction or gangrene    Bilateral impacted cerumen    Lumbar strain, initial encounter    Hypopigmentation    Rectal pain        History  "reviewed. No pertinent surgical history.    Social History     Socioeconomic History    Marital status: Single   Tobacco Use    Smoking status: Never    Smokeless tobacco: Never   Vaping Use    Vaping Use: Never used   Substance and Sexual Activity    Alcohol use: Yes     Comment: occ, socially    Drug use: Never    Sexual activity: Defer       History reviewed. No pertinent family history.    Family history, surgical history, past medical history, Allergies and meds reviewed with patient today and updated in [employer] EMR.     ROS:  Review of Systems   Constitutional:  Negative for fatigue.   Gastrointestinal:  Positive for rectal pain. Negative for abdominal pain, anal bleeding, blood in stool, constipation and diarrhea.       OBJECTIVE:  Vitals:    12/15/23 1627   BP: 136/85   BP Location: Left arm   Patient Position: Sitting   Pulse: 87   Temp: 97.9 °F (36.6 °C)   SpO2: 99%   Weight: 73.5 kg (162 lb)   Height: 172.7 cm (68\")     No results found.   Body mass index is 24.63 kg/m².  No LMP for male patient.    Physical Exam  Vitals and nursing note reviewed.   Constitutional:       General: He is not in acute distress.     Appearance: Normal appearance. He is normal weight.   HENT:      Head: Normocephalic.   Abdominal:      General: Abdomen is flat. Bowel sounds are normal.      Palpations: Abdomen is soft. There is no mass.      Tenderness: There is no abdominal tenderness.   Genitourinary:     Rectum: Normal. No mass or tenderness. Normal anal tone.   Neurological:      Mental Status: He is alert.         Procedures    No visits with results within 30 Day(s) from this visit.   Latest known visit with results is:   Office Visit on 02/28/2023   Component Date Value Ref Range Status    Color 02/28/2023 Yellow  Yellow, Straw, Dark Yellow, Lily Final    Clarity, UA 02/28/2023 Clear  Clear Final    Specific Gravity  02/28/2023 1.030  1.005 - 1.030 Final    pH, Urine 02/28/2023 5.5  5.0 - 8.0 Final    Leukocytes " 02/28/2023 Negative  Negative Final    Nitrite, UA 02/28/2023 Negative  Negative Final    Protein, POC 02/28/2023 Negative  Negative mg/dL Final    Glucose, UA 02/28/2023 Negative  Negative mg/dL Final    Ketones, UA 02/28/2023 Negative  Negative Final    Urobilinogen, UA 02/28/2023 0.2 E.U./dL  Normal, 0.2 E.U./dL Final    Bilirubin 02/28/2023 Negative  Negative Final    Blood, UA 02/28/2023 Negative  Negative Final    Lot Number 02/28/2023 [id number]   Final    Expiration Date 02/28/2023 4/2,024   Final       ASSESSMENT/ PLAN:    Diagnoses and all orders for this visit:    1. Rectal pain (Primary)  Assessment & Plan:  His exam here today is normal.  His symptoms have improved almost to 100%.  I think this was just some aggravation from straining with his exercise to cause some rectal strain and spasm.  If his symptoms seem to come back and/or worsen him he would need anoscopy or flexible sigmoidoscopy to evaluate it further.          BMI is within normal parameters. No other follow-up for BMI required.      Orders Placed Today:     No orders of the defined types were placed in this encounter.       Management Plan:     An After Visit Summary was printed and given to the patient at discharge.    Follow-up: Return if symptoms worsen or fail to improve.    Micha Sotelo,  12/15/2023 17:02 EST  This note was electronically signed.

## 2023-12-15 NOTE — ASSESSMENT & PLAN NOTE
His exam here today is normal.  His symptoms have improved almost to 100%.  I think this was just some aggravation from straining with his exercise to cause some rectal strain and spasm.  If his symptoms seem to come back and/or worsen him he would need anoscopy or flexible sigmoidoscopy to evaluate it further.